# Patient Record
Sex: FEMALE | Race: OTHER | HISPANIC OR LATINO
[De-identification: names, ages, dates, MRNs, and addresses within clinical notes are randomized per-mention and may not be internally consistent; named-entity substitution may affect disease eponyms.]

---

## 2017-08-17 ENCOUNTER — APPOINTMENT (OUTPATIENT)
Dept: ULTRASOUND IMAGING | Facility: CLINIC | Age: 42
End: 2017-08-17
Payer: COMMERCIAL

## 2017-08-17 ENCOUNTER — APPOINTMENT (OUTPATIENT)
Dept: MAMMOGRAPHY | Facility: CLINIC | Age: 42
End: 2017-08-17
Payer: COMMERCIAL

## 2017-08-17 ENCOUNTER — OUTPATIENT (OUTPATIENT)
Dept: OUTPATIENT SERVICES | Facility: HOSPITAL | Age: 42
LOS: 1 days | End: 2017-08-17

## 2017-08-17 PROCEDURE — G0279: CPT | Mod: 26

## 2017-08-17 PROCEDURE — G0204: CPT | Mod: 26

## 2017-08-17 PROCEDURE — 76641 ULTRASOUND BREAST COMPLETE: CPT | Mod: 26,50

## 2017-08-23 ENCOUNTER — APPOINTMENT (OUTPATIENT)
Dept: MAMMOGRAPHY | Facility: IMAGING CENTER | Age: 42
End: 2017-08-23

## 2017-08-23 ENCOUNTER — APPOINTMENT (OUTPATIENT)
Dept: ULTRASOUND IMAGING | Facility: IMAGING CENTER | Age: 42
End: 2017-08-23

## 2018-02-15 ENCOUNTER — FORM ENCOUNTER (OUTPATIENT)
Age: 43
End: 2018-02-15

## 2018-02-16 ENCOUNTER — OUTPATIENT (OUTPATIENT)
Dept: OUTPATIENT SERVICES | Facility: HOSPITAL | Age: 43
LOS: 1 days | End: 2018-02-16
Payer: COMMERCIAL

## 2018-02-16 ENCOUNTER — APPOINTMENT (OUTPATIENT)
Dept: ORTHOPEDIC SURGERY | Facility: CLINIC | Age: 43
End: 2018-02-16
Payer: COMMERCIAL

## 2018-02-16 VITALS — BODY MASS INDEX: 21.9 KG/M2 | WEIGHT: 119 LBS | HEIGHT: 62 IN

## 2018-02-16 DIAGNOSIS — Z63.4 DISAPPEARANCE AND DEATH OF FAMILY MEMBER: ICD-10-CM

## 2018-02-16 DIAGNOSIS — Z78.9 OTHER SPECIFIED HEALTH STATUS: ICD-10-CM

## 2018-02-16 PROCEDURE — 73564 X-RAY EXAM KNEE 4 OR MORE: CPT

## 2018-02-16 PROCEDURE — 99203 OFFICE O/P NEW LOW 30 MIN: CPT

## 2018-02-16 PROCEDURE — 73564 X-RAY EXAM KNEE 4 OR MORE: CPT | Mod: 26,50

## 2018-02-16 SDOH — SOCIAL STABILITY - SOCIAL INSECURITY: DISSAPEARANCE AND DEATH OF FAMILY MEMBER: Z63.4

## 2018-05-18 ENCOUNTER — APPOINTMENT (OUTPATIENT)
Dept: INTERNAL MEDICINE | Facility: CLINIC | Age: 43
End: 2018-05-18

## 2018-05-25 ENCOUNTER — APPOINTMENT (OUTPATIENT)
Dept: INTERNAL MEDICINE | Facility: CLINIC | Age: 43
End: 2018-05-25
Payer: COMMERCIAL

## 2018-05-25 VITALS
HEART RATE: 70 BPM | DIASTOLIC BLOOD PRESSURE: 51 MMHG | RESPIRATION RATE: 16 BRPM | HEIGHT: 60 IN | WEIGHT: 112 LBS | OXYGEN SATURATION: 100 % | SYSTOLIC BLOOD PRESSURE: 88 MMHG | BODY MASS INDEX: 21.99 KG/M2 | TEMPERATURE: 97.7 F

## 2018-05-25 PROCEDURE — 99386 PREV VISIT NEW AGE 40-64: CPT

## 2018-05-25 PROCEDURE — 99202 OFFICE O/P NEW SF 15 MIN: CPT | Mod: 25

## 2018-05-29 RX ORDER — IBUPROFEN 400 MG/1
400 TABLET, FILM COATED ORAL 3 TIMES DAILY
Qty: 42 | Refills: 0 | Status: DISCONTINUED | COMMUNITY
Start: 2018-02-16 | End: 2018-05-29

## 2018-05-29 RX ORDER — IBUPROFEN 200 MG/1
TABLET, COATED ORAL
Refills: 0 | Status: DISCONTINUED | COMMUNITY
End: 2018-05-29

## 2018-05-29 RX ORDER — CLOTRIMAZOLE 10 MG/G
1 CREAM TOPICAL
Qty: 30 | Refills: 0 | Status: DISCONTINUED | COMMUNITY
Start: 2017-10-06 | End: 2018-05-29

## 2018-05-29 NOTE — HISTORY OF PRESENT ILLNESS
[de-identified] : JUAN HENDRICKS is a 42 year old female with h/o patellofemoral pain syndrome here to establish medical care.  Last CPE 1 year ago.  Today feels well - denies chest pain, SOB, dizziness, weakness, palpitations, lightheadedness or syncope\par .

## 2018-05-29 NOTE — HEALTH RISK ASSESSMENT
[Good] : ~his/her~  mood as  good [No falls in past year] : Patient reported no falls in the past year [0] : 2) Feeling down, depressed, or hopeless: Not at all (0) [Patient reported mammogram was normal] : Patient reported mammogram was normal [Patient reported PAP Smear was normal] : Patient reported PAP Smear was normal [With Family] : lives with family [Employed] : employed [Student] : student [# Of Children ___] : has [unfilled] children [Fully functional (bathing, dressing, toileting, transferring, walking, feeding)] : Fully functional (bathing, dressing, toileting, transferring, walking, feeding) [Fully functional (using the telephone, shopping, preparing meals, housekeeping, doing laundry, using] : Fully functional and needs no help or supervision to perform IADLs (using the telephone, shopping, preparing meals, housekeeping, doing laundry, using transportation, managing medications and managing finances) [Reports changes in vision] : Reports changes in vision [Discussed at today's visit] : Advance Directives Discussed at today's visit [] : No [[id number]] : 0 [Change in mental status noted] : No change in mental status noted [Reports changes in hearing] : Reports no changes in hearing [Reports changes in dental health] : Reports no changes in dental health [MammogramDate] : 08/17 [PapSmearDate] : 08/17 [de-identified] : son [FreeTextEntry2] : nurse, [employer], pursuing Masters RN [de-identified] : ophthalmolgy exam 1/10/18

## 2018-05-29 NOTE — REVIEW OF SYSTEMS
[Joint Pain] : joint pain [Negative] : Heme/Lymph [FreeTextEntry4] : nasal congestion [FreeTextEntry9] : right patellofemoral syndrome

## 2018-05-29 NOTE — ASSESSMENT
[FreeTextEntry1] : PLAN\par 42 year old woman with history of patellofemoral syndrome presents for a routine exam, in good general health, stable on the current regimen.  Diet and lifestyle modifications as counseled. Conservative therapy for management of nasal and chest congestion as counseled.  The plan as ordered.\par

## 2018-05-31 LAB
ALBUMIN SERPL ELPH-MCNC: 4.4 G/DL
ALP BLD-CCNC: 70 U/L
ALT SERPL-CCNC: 15 U/L
ANION GAP SERPL CALC-SCNC: 12 MMOL/L
APPEARANCE: CLEAR
AST SERPL-CCNC: 18 U/L
BACTERIA: NEGATIVE
BASOPHILS # BLD AUTO: 0.01 K/UL
BASOPHILS NFR BLD AUTO: 0.2 %
BILIRUB SERPL-MCNC: 0.3 MG/DL
BILIRUBIN URINE: NEGATIVE
BLOOD URINE: NEGATIVE
BUN SERPL-MCNC: 17 MG/DL
CALCIUM SERPL-MCNC: 9.4 MG/DL
CHLORIDE SERPL-SCNC: 107 MMOL/L
CHOLEST SERPL-MCNC: 151 MG/DL
CHOLEST/HDLC SERPL: 2.6 RATIO
CO2 SERPL-SCNC: 23 MMOL/L
COLOR: YELLOW
CREAT SERPL-MCNC: 0.83 MG/DL
EOSINOPHIL # BLD AUTO: 0.17 K/UL
EOSINOPHIL NFR BLD AUTO: 3.2 %
ESTIMATED AVERAGE GLUCOSE: 94 MG/DL
GLUCOSE QUALITATIVE U: NEGATIVE MG/DL
GLUCOSE SERPL-MCNC: 89 MG/DL
HBA1C MFR BLD HPLC: 4.9 %
HCT VFR BLD CALC: 38.1 %
HDLC SERPL-MCNC: 59 MG/DL
HGB BLD-MCNC: 12.8 G/DL
HYALINE CASTS: 1 /LPF
IMM GRANULOCYTES NFR BLD AUTO: 0 %
KETONES URINE: NEGATIVE
LDLC SERPL CALC-MCNC: 80 MG/DL
LEUKOCYTE ESTERASE URINE: NEGATIVE
LYMPHOCYTES # BLD AUTO: 1.75 K/UL
LYMPHOCYTES NFR BLD AUTO: 32.9 %
MAN DIFF?: NORMAL
MCHC RBC-ENTMCNC: 30.5 PG
MCHC RBC-ENTMCNC: 33.6 GM/DL
MCV RBC AUTO: 90.7 FL
MICROSCOPIC-UA: NORMAL
MONOCYTES # BLD AUTO: 0.22 K/UL
MONOCYTES NFR BLD AUTO: 4.1 %
NEUTROPHILS # BLD AUTO: 3.17 K/UL
NEUTROPHILS NFR BLD AUTO: 59.6 %
NITRITE URINE: NEGATIVE
PH URINE: 5.5
PLATELET # BLD AUTO: 223 K/UL
POTASSIUM SERPL-SCNC: 4.4 MMOL/L
PROT SERPL-MCNC: 7 G/DL
PROTEIN URINE: NEGATIVE MG/DL
RBC # BLD: 4.2 M/UL
RBC # FLD: 13.3 %
RED BLOOD CELLS URINE: 0 /HPF
SODIUM SERPL-SCNC: 142 MMOL/L
SPECIFIC GRAVITY URINE: 1.02
SQUAMOUS EPITHELIAL CELLS: 2 /HPF
TRIGL SERPL-MCNC: 62 MG/DL
UROBILINOGEN URINE: NEGATIVE MG/DL
WBC # FLD AUTO: 5.32 K/UL
WHITE BLOOD CELLS URINE: 0 /HPF

## 2018-08-05 ENCOUNTER — FORM ENCOUNTER (OUTPATIENT)
Age: 43
End: 2018-08-05

## 2018-08-06 ENCOUNTER — APPOINTMENT (OUTPATIENT)
Dept: MRI IMAGING | Facility: CLINIC | Age: 43
End: 2018-08-06
Payer: COMMERCIAL

## 2018-08-06 ENCOUNTER — APPOINTMENT (OUTPATIENT)
Dept: ORTHOPEDIC SURGERY | Facility: CLINIC | Age: 43
End: 2018-08-06
Payer: COMMERCIAL

## 2018-08-06 ENCOUNTER — OUTPATIENT (OUTPATIENT)
Dept: OUTPATIENT SERVICES | Facility: HOSPITAL | Age: 43
LOS: 1 days | End: 2018-08-06

## 2018-08-06 DIAGNOSIS — Z82.5 FAMILY HISTORY OF ASTHMA AND OTHER CHRONIC LOWER RESPIRATORY DISEASES: ICD-10-CM

## 2018-08-06 DIAGNOSIS — Z83.79 FAMILY HISTORY OF OTHER DISEASES OF THE DIGESTIVE SYSTEM: ICD-10-CM

## 2018-08-06 DIAGNOSIS — Z87.19 PERSONAL HISTORY OF OTHER DISEASES OF THE DIGESTIVE SYSTEM: ICD-10-CM

## 2018-08-06 DIAGNOSIS — Z82.69 FAMILY HISTORY OF OTHER DISEASES OF THE MUSCULOSKELETAL SYSTEM AND CONNECTIVE TISSUE: ICD-10-CM

## 2018-08-06 PROCEDURE — 99213 OFFICE O/P EST LOW 20 MIN: CPT

## 2018-08-06 PROCEDURE — 73721 MRI JNT OF LWR EXTRE W/O DYE: CPT | Mod: 26,RT

## 2018-08-09 ENCOUNTER — APPOINTMENT (OUTPATIENT)
Dept: ORTHOPEDIC SURGERY | Facility: CLINIC | Age: 43
End: 2018-08-09
Payer: COMMERCIAL

## 2018-08-09 PROCEDURE — 99213 OFFICE O/P EST LOW 20 MIN: CPT

## 2018-09-24 ENCOUNTER — APPOINTMENT (OUTPATIENT)
Dept: OBGYN | Facility: CLINIC | Age: 43
End: 2018-09-24
Payer: COMMERCIAL

## 2018-09-24 VITALS
SYSTOLIC BLOOD PRESSURE: 92 MMHG | HEIGHT: 60 IN | TEMPERATURE: 97.8 F | BODY MASS INDEX: 22.19 KG/M2 | OXYGEN SATURATION: 98 % | DIASTOLIC BLOOD PRESSURE: 74 MMHG | HEART RATE: 83 BPM | WEIGHT: 113 LBS

## 2018-09-24 DIAGNOSIS — Z83.3 FAMILY HISTORY OF DIABETES MELLITUS: ICD-10-CM

## 2018-09-24 PROCEDURE — 99203 OFFICE O/P NEW LOW 30 MIN: CPT

## 2018-09-27 LAB
C TRACH RRNA SPEC QL NAA+PROBE: NOT DETECTED
HPV HIGH+LOW RISK DNA PNL CVX: NOT DETECTED
N GONORRHOEA RRNA SPEC QL NAA+PROBE: NOT DETECTED
SOURCE TP AMPLIFICATION: NORMAL

## 2018-09-28 LAB — CYTOLOGY CVX/VAG DOC THIN PREP: NORMAL

## 2019-05-31 ENCOUNTER — APPOINTMENT (OUTPATIENT)
Dept: INTERNAL MEDICINE | Facility: CLINIC | Age: 44
End: 2019-05-31
Payer: COMMERCIAL

## 2019-05-31 ENCOUNTER — APPOINTMENT (OUTPATIENT)
Dept: OBGYN | Facility: CLINIC | Age: 44
End: 2019-05-31

## 2019-05-31 VITALS
HEART RATE: 81 BPM | DIASTOLIC BLOOD PRESSURE: 63 MMHG | HEIGHT: 60 IN | RESPIRATION RATE: 16 BRPM | TEMPERATURE: 98.3 F | OXYGEN SATURATION: 98 % | SYSTOLIC BLOOD PRESSURE: 97 MMHG | WEIGHT: 120 LBS | BODY MASS INDEX: 23.56 KG/M2

## 2019-05-31 DIAGNOSIS — Z11.3 ENCOUNTER FOR SCREENING FOR INFECTIONS WITH A PREDOMINANTLY SEXUAL MODE OF TRANSMISSION: ICD-10-CM

## 2019-05-31 DIAGNOSIS — Z87.39 PERSONAL HISTORY OF OTHER DISEASES OF THE MUSCULOSKELETAL SYSTEM AND CONNECTIVE TISSUE: ICD-10-CM

## 2019-05-31 DIAGNOSIS — Z12.31 ENCOUNTER FOR SCREENING MAMMOGRAM FOR MALIGNANT NEOPLASM OF BREAST: ICD-10-CM

## 2019-05-31 DIAGNOSIS — Z87.09 PERSONAL HISTORY OF OTHER DISEASES OF THE RESPIRATORY SYSTEM: ICD-10-CM

## 2019-05-31 DIAGNOSIS — Z87.898 PERSONAL HISTORY OF OTHER SPECIFIED CONDITIONS: ICD-10-CM

## 2019-05-31 DIAGNOSIS — R09.89 OTHER SPECIFIED SYMPTOMS AND SIGNS INVOLVING THE CIRCULATORY AND RESPIRATORY SYSTEMS: ICD-10-CM

## 2019-05-31 DIAGNOSIS — Z91.89 OTHER SPECIFIED PERSONAL RISK FACTORS, NOT ELSEWHERE CLASSIFIED: ICD-10-CM

## 2019-05-31 PROCEDURE — 90471 IMMUNIZATION ADMIN: CPT

## 2019-05-31 PROCEDURE — 90715 TDAP VACCINE 7 YRS/> IM: CPT

## 2019-05-31 PROCEDURE — 99386 PREV VISIT NEW AGE 40-64: CPT | Mod: 25

## 2019-05-31 PROCEDURE — 99396 PREV VISIT EST AGE 40-64: CPT | Mod: 25

## 2019-05-31 NOTE — PHYSICAL EXAM
[No Acute Distress] : no acute distress [Well Nourished] : well nourished [Well Developed] : well developed [Well-Appearing] : well-appearing [Normal Sclera/Conjunctiva] : normal sclera/conjunctiva [PERRL] : pupils equal round and reactive to light [EOMI] : extraocular movements intact [Normal Outer Ear/Nose] : the outer ears and nose were normal in appearance [Normal Oropharynx] : the oropharynx was normal [No JVD] : no jugular venous distention [Supple] : supple [No Lymphadenopathy] : no lymphadenopathy [Thyroid Normal, No Nodules] : the thyroid was normal and there were no nodules present [No Respiratory Distress] : no respiratory distress  [Clear to Auscultation] : lungs were clear to auscultation bilaterally [No Accessory Muscle Use] : no accessory muscle use [Normal Rate] : normal rate  [Regular Rhythm] : with a regular rhythm [Normal S1, S2] : normal S1 and S2 [No Murmur] : no murmur heard [No Carotid Bruits] : no carotid bruits [No Abdominal Bruit] : a ~M bruit was not heard ~T in the abdomen [No Varicosities] : no varicosities [Pedal Pulses Present] : the pedal pulses are present [No Edema] : there was no peripheral edema [No Extremity Clubbing/Cyanosis] : no extremity clubbing/cyanosis [No Palpable Aorta] : no palpable aorta [Normal Appearance] : normal in appearance [No Nipple Discharge] : no nipple discharge [No Axillary Lymphadenopathy] : no axillary lymphadenopathy [Soft] : abdomen soft [Non Tender] : non-tender [Non-distended] : non-distended [No Masses] : no abdominal mass palpated [No HSM] : no HSM [Normal Bowel Sounds] : normal bowel sounds [Normal Supraclavicular Nodes] : no supraclavicular lymphadenopathy [Normal Posterior Cervical Nodes] : no posterior cervical lymphadenopathy [Normal Anterior Cervical Nodes] : no anterior cervical lymphadenopathy [No CVA Tenderness] : no CVA  tenderness [No Spinal Tenderness] : no spinal tenderness [No Joint Swelling] : no joint swelling [Grossly Normal Strength/Tone] : grossly normal strength/tone [No Rash] : no rash [Normal Gait] : normal gait [Coordination Grossly Intact] : coordination grossly intact [No Focal Deficits] : no focal deficits [Normal Affect] : the affect was normal [Normal Insight/Judgement] : insight and judgment were intact

## 2019-06-01 NOTE — ASSESSMENT
[FreeTextEntry1] : [facility].  UTD on cervical cancer screening.  received tdap today. routine labs

## 2019-06-01 NOTE — HEALTH RISK ASSESSMENT
[0] : 2) Feeling down, depressed, or hopeless: Not at all (0) [Patient reported PAP Smear was normal] : Patient reported PAP Smear was normal [Fully functional (bathing, dressing, toileting, transferring, walking, feeding)] : Fully functional (bathing, dressing, toileting, transferring, walking, feeding) [Fully functional (using the telephone, shopping, preparing meals, housekeeping, doing laundry, using] : Fully functional and needs no help or supervision to perform IADLs (using the telephone, shopping, preparing meals, housekeeping, doing laundry, using transportation, managing medications and managing finances) [] : No [[id number]] : 0 [Reports changes in hearing] : Reports no changes in hearing [Reports changes in vision] : Reports no changes in vision [Reports changes in dental health] : Reports no changes in dental health [PapSmearDate] : 09/2018

## 2019-06-01 NOTE — HISTORY OF PRESENT ILLNESS
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 knee pain is minimal \par no complaints

## 2019-06-04 LAB
APPEARANCE: CLEAR
BILIRUBIN URINE: NEGATIVE
BLOOD URINE: NEGATIVE
COLOR: YELLOW
GLUCOSE QUALITATIVE U: NEGATIVE
KETONES URINE: NEGATIVE
LEUKOCYTE ESTERASE URINE: NEGATIVE
NITRITE URINE: NEGATIVE
PH URINE: 5.5
PROTEIN URINE: NORMAL
SPECIFIC GRAVITY URINE: 1.03
TSH SERPL-ACNC: 1.72 UIU/ML
UROBILINOGEN URINE: NORMAL

## 2019-06-17 ENCOUNTER — APPOINTMENT (OUTPATIENT)
Dept: OBGYN | Facility: CLINIC | Age: 44
End: 2019-06-17

## 2019-06-24 ENCOUNTER — APPOINTMENT (OUTPATIENT)
Dept: OBGYN | Facility: CLINIC | Age: 44
End: 2019-06-24
Payer: COMMERCIAL

## 2019-06-24 VITALS
HEIGHT: 60 IN | HEART RATE: 85 BPM | WEIGHT: 119 LBS | DIASTOLIC BLOOD PRESSURE: 60 MMHG | SYSTOLIC BLOOD PRESSURE: 93 MMHG | BODY MASS INDEX: 23.36 KG/M2

## 2019-06-24 DIAGNOSIS — Z30.011 ENCOUNTER FOR INITIAL PRESCRIPTION OF CONTRACEPTIVE PILLS: ICD-10-CM

## 2019-06-24 DIAGNOSIS — M76.31 ILIOTIBIAL BAND SYNDROME, RIGHT LEG: ICD-10-CM

## 2019-06-24 DIAGNOSIS — M22.2X1 PATELLOFEMORAL DISORDERS, RIGHT KNEE: ICD-10-CM

## 2019-06-24 DIAGNOSIS — M23.8X1 OTHER INTERNAL DERANGEMENTS OF RIGHT KNEE: ICD-10-CM

## 2019-06-24 PROCEDURE — 99213 OFFICE O/P EST LOW 20 MIN: CPT

## 2019-06-24 NOTE — CHIEF COMPLAINT
[FreeTextEntry1] : LMP (06/16/2019)\par \par Patient recently became sexually active, wants to start OCP's.

## 2019-07-04 ENCOUNTER — TRANSCRIPTION ENCOUNTER (OUTPATIENT)
Age: 44
End: 2019-07-04

## 2019-07-10 DIAGNOSIS — K13.79 OTHER LESIONS OF ORAL MUCOSA: ICD-10-CM

## 2019-08-08 ENCOUNTER — APPOINTMENT (OUTPATIENT)
Dept: INTERNAL MEDICINE | Facility: CLINIC | Age: 44
End: 2019-08-08

## 2019-10-31 ENCOUNTER — APPOINTMENT (OUTPATIENT)
Dept: OBGYN | Facility: CLINIC | Age: 44
End: 2019-10-31
Payer: COMMERCIAL

## 2019-10-31 VITALS
WEIGHT: 123 LBS | TEMPERATURE: 97.8 F | BODY MASS INDEX: 24.15 KG/M2 | HEART RATE: 87 BPM | SYSTOLIC BLOOD PRESSURE: 82 MMHG | OXYGEN SATURATION: 98 % | DIASTOLIC BLOOD PRESSURE: 49 MMHG | HEIGHT: 60 IN

## 2019-10-31 PROCEDURE — 99396 PREV VISIT EST AGE 40-64: CPT

## 2019-10-31 NOTE — PHYSICAL EXAM
[Awake] : awake [Alert] : alert [Soft] : soft [Oriented x3] : oriented to person, place, and time [Normal] : uterus [No Bleeding] : there was no active vaginal bleeding [Discharge] : had a ~M discharge [Moderate] : moderate [White] : white [Thick] : thick [Uterine Adnexae] : were not tender and not enlarged [Acute Distress] : no acute distress [Mass] : no breast mass [Nipple Discharge] : no nipple discharge [Axillary LAD] : no axillary lymphadenopathy [Tender] : non tender [Foul Smelling] : not foul smelling

## 2019-11-02 LAB
C TRACH RRNA SPEC QL NAA+PROBE: NOT DETECTED
N GONORRHOEA RRNA SPEC QL NAA+PROBE: NOT DETECTED
SOURCE AMPLIFICATION: NORMAL

## 2019-12-23 ENCOUNTER — EMERGENCY (EMERGENCY)
Facility: HOSPITAL | Age: 44
LOS: 1 days | Discharge: HOME | End: 2019-12-23
Attending: STUDENT IN AN ORGANIZED HEALTH CARE EDUCATION/TRAINING PROGRAM | Admitting: STUDENT IN AN ORGANIZED HEALTH CARE EDUCATION/TRAINING PROGRAM
Payer: OTHER MISCELLANEOUS

## 2019-12-23 VITALS
OXYGEN SATURATION: 100 % | SYSTOLIC BLOOD PRESSURE: 119 MMHG | TEMPERATURE: 98 F | DIASTOLIC BLOOD PRESSURE: 75 MMHG | RESPIRATION RATE: 18 BRPM | HEART RATE: 71 BPM

## 2019-12-23 PROCEDURE — 99053 MED SERV 10PM-8AM 24 HR FAC: CPT

## 2019-12-23 PROCEDURE — 73030 X-RAY EXAM OF SHOULDER: CPT | Mod: 26,LT

## 2019-12-23 PROCEDURE — 99283 EMERGENCY DEPT VISIT LOW MDM: CPT

## 2019-12-23 RX ORDER — ACETAMINOPHEN 500 MG
650 TABLET ORAL ONCE
Refills: 0 | Status: COMPLETED | OUTPATIENT
Start: 2019-12-23 | End: 2019-12-23

## 2019-12-23 RX ADMIN — Medication 650 MILLIGRAM(S): at 08:46

## 2019-12-23 NOTE — ED PROVIDER NOTE - CLINICAL SUMMARY MEDICAL DECISION MAKING FREE TEXT BOX
43 y/o F pt presents to ED with L shoulder pain, likely muscle strain but low suspicion for dislocation. Will give analgesia, obtain shoulder XR, and anticipate dc with sports meds f/u.

## 2019-12-23 NOTE — ED PROVIDER NOTE - PHYSICAL EXAMINATION
MSK: R shoulder full range of motion w/o pain. L shoulder can abduct to 180 degrees but has limited motion beyond 180 secondary to pain, full range of motion with assistance. No gross abnormality noted.

## 2019-12-23 NOTE — ED ADULT TRIAGE NOTE - CHIEF COMPLAINT QUOTE
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

## 2019-12-23 NOTE — ED PROVIDER NOTE - PATIENT PORTAL LINK FT
You can access the FollowMyHealth Patient Portal offered by [facility] by registering at the following website: [email or web address]. By joining [employer]’s FollowMyHealth portal, you will also be able to view your health information using other applications (apps) compatible with our system.

## 2019-12-26 ENCOUNTER — APPOINTMENT (OUTPATIENT)
Dept: ORTHOPEDIC SURGERY | Facility: CLINIC | Age: 44
End: 2019-12-26
Payer: OTHER MISCELLANEOUS

## 2019-12-26 VITALS — HEIGHT: 62 IN | BODY MASS INDEX: 21.71 KG/M2 | WEIGHT: 118 LBS

## 2019-12-26 PROCEDURE — 99215 OFFICE O/P EST HI 40 MIN: CPT

## 2019-12-26 PROCEDURE — 73030 X-RAY EXAM OF SHOULDER: CPT | Mod: LT

## 2019-12-26 NOTE — PHYSICAL EXAM
[de-identified] : Left shoulder radiographs (12/23/19): Possible AC joint widening. No acute fracture. Glenohumeral joint reduced.  \par \par AP chest obtained in the office today shows separation of the left AC joint compared to the right AC joint\par \par Zanca view of left shoulder obtained in the office today shows AC joint separation [de-identified] : General: No acute distress, conversant, well-nourished.\par Head: Normocephalic, atraumatic\par Neck: trachea midline, FROM\par Heart: normotensive and normal rate and rhythm\par Lungs: No labored breathing\par Skin: No abrasions, no rashes, no edema\par Psych: Alert and oriented to person, place and time\par Extremities: no peripheral edema or digital cyanosis\par Gait: Normal gait. Can perform tandem gait.  \par Vascular: warm and well perfused distally, palpable distal pulses\par \par MSK:\par Left Shoulder Exam:\par No swelling, no erythema.  \par Tender to palpation at AC joint\par Pain with active ROM\par \par Positive Neer's impingement sign\par Positive Hawkin's test\par Positive Andrea's test\par Pain but good strength with shoulder ER and IR.\par \par No tenderness at bicipital groove\par Negative Speed's test\par Negative Yergson's test\par \par Negative Cross-body Adduction\par Negative [place]'s test\par \par Sensation intact to light touch axillary, medial and lateral antebrachial cutaneous, median, radial and ulnar distributions\par +motor deltoid, biceps, triceps, EPL, FDP and IO\par palpable radial pulse, WWP distally\par \par MSK:\par Cervical Spine: \par Alignment normal. \par No tenderness to palpation.  No step-off, no deformity.\par No pain or neurologic symptoms with full active range of motion (flexion, extension, lateral bending and rotation).\par \par NEURO:\par Sensation \par          Left           \par C5     2/2               \par C6     2/2               \par C7     2/2               \par C8     2/2              \par T1     2/2             \par \par          Right         \par C5     2/2               \par C6     2/2               \par C7     2/2               \par C8     2/2              \par T1     2/2      \par \par Motor: \par                                                Left             \par C5 (deltoid abduction)             5/5               \par C6 (biceps flexion)                   5/5                \par C7 (triceps extension)             5/5               \par C8 (finger flexion)                     5/5               \par T1 (interosseous)                     5/5           \par \par                                                Right           \par C5 (deltoid abduction)             5/5               \par C6 (biceps flexion)                   5/5                \par C7 (triceps extension)             5/5               \par C8 (finger flexion)                     5/5               \par T1 (interosseous)                     5/5                     \par \par Negative Spurling’s test. Negative Quezada’s reflex.  \par

## 2019-12-26 NOTE — PROCEDURE
[de-identified] : Procedure: Left shoulder subacromial injection with corticosteroid\par Pre-Procedure Diagnosis: Left shoulder rotator cuff tendinitis\par Post-Procedure Diagnosis: same\par \par The patient was educated about the risks and benefits of a corticosteroid injection.  Alternatives were discussed.  The patient understood and consented for the procedure.\par \par The area was sterilely prepped using isopropyl alcohol.  An ethyl chloride spray provided  local anesthesia.  Using the usual sterile technique, 1 ml of 40mg/1ml of Kenalog and 2 ml of Lidocaine 1% without epinephrine was injected into the left shoulder subacromial space.  A dressing was applied to the area.  The patient tolerated the procedure well and without complication.\par

## 2019-12-26 NOTE — ASSESSMENT
[FreeTextEntry1] : 44 year old female with left Grade 1-2 AC joint separation and left rotator cuff tendinitis after a lifting injury at work on 12/23/19.  She was given a left shoulder subacromial corticosteroid injection.  She is not to lift more than 5 lbs with her left upper extremity.  She was given a referral for physical therapy for her left shoulder for rotator cuff exercises. She can take anti-inflammatories as needed for pain.  She will be out of work for at least 2 weeks.  She will followup in 2 weeks for re-evaluation.  She knows to call with any questions or concerns or if her symptoms acutely worsen.

## 2020-01-09 ENCOUNTER — APPOINTMENT (OUTPATIENT)
Dept: ORTHOPEDIC SURGERY | Facility: CLINIC | Age: 45
End: 2020-01-09
Payer: OTHER MISCELLANEOUS

## 2020-01-09 PROCEDURE — 99213 OFFICE O/P EST LOW 20 MIN: CPT

## 2020-01-10 NOTE — ASSESSMENT
[FreeTextEntry1] : 44 year old female with left AC joint injury and rotator cuff tendinitis.  The rotator cuff tendinitis has improved and the pain resolved after her steroid injection during the last visit.  She has good ROM of that shoulder.  She still has pain and tenderness in her left AC joint.  She should continue to not lift more than 5 lbs with that shoulder.  She will continue physical therapy.  She will followup in 1 month.  She knows to call with any questions or concerns or if her symptoms acutely worsen.

## 2020-01-10 NOTE — PHYSICAL EXAM
[de-identified] : General: No acute distress, conversant, well-nourished.\par Head: Normocephalic, atraumatic\par Neck: trachea midline, FROM\par Heart: normotensive and normal rate and rhythm\par Lungs: No labored breathing\par Skin: No abrasions, no rashes, no edema\par Psych: Alert and oriented to person, place and time\par Extremities: no peripheral edema or digital cyanosis\par Gait: Normal gait. Can perform tandem gait. \par Vascular: warm and well perfused distally, palpable distal pulses\par \par MSK:\par Left Shoulder Exam:\par No swelling, no erythema. \par Tender to palpation at AC joint\par No pain with active ROM\par \par Negative Neer's impingement sign\par Negative Hawkin's test\par Negative Andrea's test\par No pain and good strength with shoulder ER and IR.\par \par No tenderness at bicipital groove\par Negative Speed's test\par Negative Yergson's test\par \par Negative Cross-body Adduction\par Negative [place]'s test\par \par Sensation intact to light touch axillary, medial and lateral antebrachial cutaneous, median, radial and ulnar distributions\par +motor deltoid, biceps, triceps, EPL, FDP and IO\par palpable radial pulse, WWP distally\par

## 2020-01-10 NOTE — HISTORY OF PRESENT ILLNESS
[de-identified] : 44 year old female returns for L shoulder pain due to a work related injury on 12/23/19.  She says her pain has significantly improved and she has good ROM of her shoulder.  However, she still feels pain at her AC joint.  She also feels increased pain when she holds her cellphone in her left hand.  She has been going to PT.

## 2020-02-04 ENCOUNTER — APPOINTMENT (OUTPATIENT)
Dept: ORTHOPEDIC SURGERY | Facility: CLINIC | Age: 45
End: 2020-02-04
Payer: OTHER MISCELLANEOUS

## 2020-02-04 DIAGNOSIS — S43.109A UNSPECIFIED DISLOCATION OF UNSPECIFIED ACROMIOCLAVICULAR JOINT, INITIAL ENCOUNTER: ICD-10-CM

## 2020-02-04 PROCEDURE — 99213 OFFICE O/P EST LOW 20 MIN: CPT

## 2020-02-06 NOTE — PHYSICAL EXAM
[de-identified] : General: No acute distress, conversant, well-nourished.\par Head: Normocephalic, atraumatic\par Neck: trachea midline, FROM\par Heart: normotensive and normal rate and rhythm\par Lungs: No labored breathing\par Skin: No abrasions, no rashes, no edema\par Psych: Alert and oriented to person, place and time\par Extremities: no peripheral edema or digital cyanosis\par Gait: Normal gait. Can perform tandem gait. \par Vascular: warm and well perfused distally, palpable distal pulses\par \par MSK:\par Left Shoulder Exam:\par No swelling, no erythema. \par Tender to palpation at AC joint\par No pain with active ROM\par \par Negative Neer's impingement sign\par Negative Hawkin's test\par Negative Andrea's test\par No pain and good strength with shoulder ER and IR.\par \par No tenderness at bicipital groove\par Negative Speed's test\par Negative Yergson's test\par \par Negative Cross-body Adduction\par Negative [place]'s test\par \par Sensation intact to light touch axillary, medial and lateral antebrachial cutaneous, median, radial and ulnar distributions\par +motor deltoid, biceps, triceps, EPL, FDP and IO\par palpable radial pulse, WWP distally

## 2020-02-06 NOTE — HISTORY OF PRESENT ILLNESS
[de-identified] : 44 year old female returns for left shoulder pain due to work related injury on 12/23/19. She says the pain is improving but she is not ready to return to work.  She has been doing physical therapy.

## 2020-02-06 NOTE — ASSESSMENT
[FreeTextEntry1] : 44 year old female with left AC joint injury and rotator cuff tendinitis.  The patient continues to feel weak and has pain in her shoulder preventing her from returning to work and despite PT.  She will be sent for a MRI of her left shoulder.  She will followup once her MRI has been completed.  She will continue physical therapy.  She knows to call with any questions or concerns or if her symptoms acutely worsen.

## 2020-02-14 ENCOUNTER — APPOINTMENT (OUTPATIENT)
Dept: INTERNAL MEDICINE | Facility: CLINIC | Age: 45
End: 2020-02-14
Payer: COMMERCIAL

## 2020-02-14 VITALS
WEIGHT: 123 LBS | DIASTOLIC BLOOD PRESSURE: 61 MMHG | RESPIRATION RATE: 16 BRPM | BODY MASS INDEX: 22.63 KG/M2 | HEIGHT: 62 IN | TEMPERATURE: 97.1 F | SYSTOLIC BLOOD PRESSURE: 95 MMHG | OXYGEN SATURATION: 97 % | HEART RATE: 76 BPM

## 2020-02-14 PROCEDURE — 99214 OFFICE O/P EST MOD 30 MIN: CPT

## 2020-02-14 NOTE — ASSESSMENT
[FreeTextEntry1] : routine labs to complete medical form\par advised that may require 10 days for quantiferon result to return -> needs form by 3/1

## 2020-02-14 NOTE — HISTORY OF PRESENT ILLNESS
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

## 2020-02-14 NOTE — PHYSICAL EXAM
[No Acute Distress] : no acute distress [Well Nourished] : well nourished [Well Developed] : well developed [EOMI] : extraocular movements intact [PERRL] : pupils equal round and reactive to light [Normal Sclera/Conjunctiva] : normal sclera/conjunctiva [Well-Appearing] : well-appearing [Normal Outer Ear/Nose] : the outer ears and nose were normal in appearance [No JVD] : no jugular venous distention [Normal Oropharynx] : the oropharynx was normal [Supple] : supple [No Lymphadenopathy] : no lymphadenopathy [No Accessory Muscle Use] : no accessory muscle use [Clear to Auscultation] : lungs were clear to auscultation bilaterally [No Respiratory Distress] : no respiratory distress  [Normal Rate] : normal rate  [Regular Rhythm] : with a regular rhythm [Normal S1, S2] : normal S1 and S2 [No Abdominal Bruit] : a ~M bruit was not heard ~T in the abdomen [No Carotid Bruits] : no carotid bruits [No Varicosities] : no varicosities [No Murmur] : no murmur heard [No Palpable Aorta] : no palpable aorta [No Edema] : there was no peripheral edema [Pedal Pulses Present] : the pedal pulses are present [No Extremity Clubbing/Cyanosis] : no extremity clubbing/cyanosis [Soft] : abdomen soft [Non Tender] : non-tender [No Masses] : no abdominal mass palpated [Non-distended] : non-distended [No HSM] : no HSM [Normal Anterior Cervical Nodes] : no anterior cervical lymphadenopathy [Normal Posterior Cervical Nodes] : no posterior cervical lymphadenopathy [Normal Bowel Sounds] : normal bowel sounds [No Spinal Tenderness] : no spinal tenderness [No CVA Tenderness] : no CVA  tenderness [No Joint Swelling] : no joint swelling [No Rash] : no rash [Grossly Normal Strength/Tone] : grossly normal strength/tone [Coordination Grossly Intact] : coordination grossly intact [No Focal Deficits] : no focal deficits [Normal Affect] : the affect was normal [Normal Gait] : normal gait [Normal Insight/Judgement] : insight and judgment were intact

## 2020-02-15 LAB
HBV SURFACE AB SER QL: REACTIVE
HBV SURFACE AG SER QL: NONREACTIVE
MEV IGG FLD QL IA: 86.4 AU/ML
MEV IGG+IGM SER-IMP: POSITIVE
MUV AB SER-ACNC: POSITIVE
MUV IGG SER QL IA: 47.6 AU/ML
RUBV IGG FLD-ACNC: 15.7 INDEX
RUBV IGG SER-IMP: POSITIVE
VZV AB TITR SER: POSITIVE
VZV IGG SER IF-ACNC: 1019 INDEX

## 2020-02-17 LAB
M TB IFN-G BLD-IMP: NEGATIVE
QUANTIFERON TB PLUS MITOGEN MINUS NIL: 8.35 IU/ML
QUANTIFERON TB PLUS NIL: 0.13 IU/ML
QUANTIFERON TB PLUS TB1 MINUS NIL: -0.04 IU/ML
QUANTIFERON TB PLUS TB2 MINUS NIL: -0.08 IU/ML

## 2020-02-21 ENCOUNTER — APPOINTMENT (OUTPATIENT)
Dept: ORTHOPEDIC SURGERY | Facility: CLINIC | Age: 45
End: 2020-02-21
Payer: OTHER MISCELLANEOUS

## 2020-02-21 PROCEDURE — 99213 OFFICE O/P EST LOW 20 MIN: CPT

## 2020-02-22 NOTE — ASSESSMENT
[FreeTextEntry1] : 44 year old female with left shoulder injury.  She has progressed well with physical therapy.  She has good function and her pain has significantly improved.  She is happy with her progress.  She is cleared to return to work without restriction.  She can followup as needed.  She knows to call with any questions or concerns or if her symptoms acutely worsen.

## 2020-02-22 NOTE — HISTORY OF PRESENT ILLNESS
[de-identified] : 44 year old female returns for followup for left shoulder pain after a work related injury on 12/23/19. She has significant improvement in her pain and function.  She is continuing PT.  She is also here to review her recent MRI.  She is off pain medication.

## 2020-02-22 NOTE — PHYSICAL EXAM
[de-identified] : General: No acute distress, conversant, well-nourished.\par Head: Normocephalic, atraumatic\par Neck: trachea midline, FROM\par Heart: normotensive and normal rate and rhythm\par Lungs: No labored breathing\par Skin: No abrasions, no rashes, no edema\par Psych: Alert and oriented to person, place and time\par Extremities: no peripheral edema or digital cyanosis\par Gait: Normal gait. Can perform tandem gait. \par Vascular: warm and well perfused distally, palpable distal pulses\par \par MSK:\par Left Shoulder Exam:\par No swelling, no erythema. \par No tenderness to palpation \par No pain with active ROM\par \par Negative Neer's impingement sign\par Negative Hawkin's test\par Negative Andrea's test\par No pain and good strength with shoulder ER and IR.\par \par No tenderness at bicipital groove\par Negative Speed's test\par Negative Yergson's test\par \par Negative Cross-body Adduction\par Negative [place]'s test\par \par Sensation intact to light touch axillary, medial and lateral antebrachial cutaneous, median, radial and ulnar distributions\par +motor deltoid, biceps, triceps, EPL, FDP and IO\par palpable radial pulse, WWP distally  [de-identified] : MRI Left Shoulder (2/14/20): Partial tear/tendinosis of distal and posterior supraspinatus tendon 393

## 2020-04-26 ENCOUNTER — MESSAGE (OUTPATIENT)
Age: 45
End: 2020-04-26

## 2020-05-07 LAB
SARS-COV-2 IGG SERPL IA-ACNC: <0.1 INDEX
SARS-COV-2 IGG SERPL QL IA: NEGATIVE

## 2020-09-15 ENCOUNTER — APPOINTMENT (OUTPATIENT)
Dept: INTERNAL MEDICINE | Facility: CLINIC | Age: 45
End: 2020-09-15
Payer: COMMERCIAL

## 2020-09-15 VITALS
HEIGHT: 62 IN | WEIGHT: 120 LBS | OXYGEN SATURATION: 99 % | HEART RATE: 78 BPM | TEMPERATURE: 98.6 F | BODY MASS INDEX: 22.08 KG/M2 | DIASTOLIC BLOOD PRESSURE: 58 MMHG | RESPIRATION RATE: 16 BRPM | SYSTOLIC BLOOD PRESSURE: 91 MMHG

## 2020-09-15 DIAGNOSIS — Z80.0 FAMILY HISTORY OF MALIGNANT NEOPLASM OF DIGESTIVE ORGANS: ICD-10-CM

## 2020-09-15 DIAGNOSIS — Z80.49 FAMILY HISTORY OF MALIGNANT NEOPLASM OF OTHER GENITAL ORGANS: ICD-10-CM

## 2020-09-15 DIAGNOSIS — Z11.59 ENCOUNTER FOR SCREENING FOR OTHER VIRAL DISEASES: ICD-10-CM

## 2020-09-15 PROCEDURE — 99396 PREV VISIT EST AGE 40-64: CPT

## 2020-09-15 PROCEDURE — 99386 PREV VISIT NEW AGE 40-64: CPT

## 2020-09-21 ENCOUNTER — APPOINTMENT (OUTPATIENT)
Dept: OBGYN | Facility: CLINIC | Age: 45
End: 2020-09-21
Payer: COMMERCIAL

## 2020-09-21 VITALS
WEIGHT: 121 LBS | DIASTOLIC BLOOD PRESSURE: 48 MMHG | TEMPERATURE: 97.5 F | HEART RATE: 88 BPM | HEIGHT: 62 IN | SYSTOLIC BLOOD PRESSURE: 82 MMHG | BODY MASS INDEX: 22.26 KG/M2

## 2020-09-21 PROCEDURE — 99396 PREV VISIT EST AGE 40-64: CPT

## 2020-09-21 RX ORDER — VALACYCLOVIR 1 G/1
1 TABLET, FILM COATED ORAL
Qty: 4 | Refills: 0 | Status: COMPLETED | COMMUNITY
Start: 2019-07-04 | End: 2020-09-21

## 2020-09-21 RX ORDER — VALACYCLOVIR 500 MG/1
500 TABLET, FILM COATED ORAL
Qty: 30 | Refills: 1 | Status: COMPLETED | COMMUNITY
Start: 2019-07-10 | End: 2020-09-21

## 2020-09-21 NOTE — PHYSICAL EXAM
[Appropriately responsive] : appropriately responsive [Alert] : alert [No Acute Distress] : no acute distress [No Lymphadenopathy] : no lymphadenopathy [Regular Rate Rhythm] : regular rate rhythm [No Murmurs] : no murmurs [Clear to Auscultation B/L] : clear to auscultation bilaterally [Soft] : soft [Non-tender] : non-tender [Non-distended] : non-distended [No HSM] : No HSM [No Lesions] : no lesions [No Mass] : no mass [Oriented x3] : oriented x3 [Examination Of The Breasts] : a normal appearance [No Masses] : no breast masses were palpable [Labia Majora] : normal [Discharge] : discharge [Scant] : scant [Foul Smelling] : not foul smelling [White] : white [Thick] : thick [Normal] : normal [No Tenderness] : no tenderness

## 2020-09-21 NOTE — HISTORY OF PRESENT ILLNESS
[Patient reported mammogram was normal] : Patient reported mammogram was normal [Patient reported PAP Smear was normal] : Patient reported PAP Smear was normal [Gonorrhea test offered] : Gonorrhea test offered [Chlamydia test offered] : Chlamydia test offered [HPV test offered] : HPV test offered [N] : Patient reports normal menses [Y] : Positive pregnancy history [LMPDate] : 09/01/2020 [MensesFreq] : 28 [MensesLength] : 3 [MensesAmount] : moderate [PGHxTotal] : 3 [[facility]ho[facility] HospitalxFulerm] : 1 [PGHxABSpont] : 2 [Previously active] : previously active [Men] : men [Vaginal] : vaginal [No] : No

## 2020-09-21 NOTE — PROCEDURE
[Cervical Pap Smear] : cervical Pap smear [Liquid Base] : liquid base [GC & Chlamydia via Pap] : GC & Chlamydia via Pap [Tolerated Well] : the patient tolerated the procedure well [No Complications] : there were no complications [de-identified] : hpv

## 2020-09-21 NOTE — COUNSELING
[Nutrition/ Exercise/ Weight Management] : nutrition, exercise, weight management [Vitamins/Supplements] : vitamins/supplements [Sunscreen] : sunscreen [Breast Self Exam] : breast self exam

## 2020-09-22 LAB
25(OH)D3 SERPL-MCNC: 20.8 NG/ML
ALBUMIN SERPL ELPH-MCNC: 4.7 G/DL
ALP BLD-CCNC: 78 U/L
ALT SERPL-CCNC: 12 U/L
ANION GAP SERPL CALC-SCNC: 12 MMOL/L
APPEARANCE: CLEAR
AST SERPL-CCNC: 14 U/L
BASOPHILS # BLD AUTO: 0.02 K/UL
BASOPHILS NFR BLD AUTO: 0.3 %
BILIRUB SERPL-MCNC: 0.4 MG/DL
BILIRUBIN URINE: NEGATIVE
BLOOD URINE: NEGATIVE
BUN SERPL-MCNC: 16 MG/DL
C TRACH RRNA SPEC QL NAA+PROBE: NOT DETECTED
CALCIUM SERPL-MCNC: 10.3 MG/DL
CHLORIDE SERPL-SCNC: 101 MMOL/L
CO2 SERPL-SCNC: 24 MMOL/L
COLOR: NORMAL
CREAT SERPL-MCNC: 0.82 MG/DL
EOSINOPHIL # BLD AUTO: 0.09 K/UL
EOSINOPHIL NFR BLD AUTO: 1.3 %
GLUCOSE QUALITATIVE U: NEGATIVE
GLUCOSE SERPL-MCNC: 87 MG/DL
HCT VFR BLD CALC: 44.2 %
HGB BLD-MCNC: 14.6 G/DL
HPV HIGH+LOW RISK DNA PNL CVX: NOT DETECTED
IMM GRANULOCYTES NFR BLD AUTO: 0.1 %
KETONES URINE: NEGATIVE
LEUKOCYTE ESTERASE URINE: NEGATIVE
LYMPHOCYTES # BLD AUTO: 1.88 K/UL
LYMPHOCYTES NFR BLD AUTO: 27.3 %
MAN DIFF?: NORMAL
MCHC RBC-ENTMCNC: 30.4 PG
MCHC RBC-ENTMCNC: 33 GM/DL
MCV RBC AUTO: 92.1 FL
MONOCYTES # BLD AUTO: 0.37 K/UL
MONOCYTES NFR BLD AUTO: 5.4 %
N GONORRHOEA RRNA SPEC QL NAA+PROBE: NOT DETECTED
NEUTROPHILS # BLD AUTO: 4.52 K/UL
NEUTROPHILS NFR BLD AUTO: 65.6 %
NITRITE URINE: NEGATIVE
PH URINE: 6
PLATELET # BLD AUTO: 244 K/UL
POTASSIUM SERPL-SCNC: 4.8 MMOL/L
PROT SERPL-MCNC: 7.4 G/DL
PROTEIN URINE: NEGATIVE
RBC # BLD: 4.8 M/UL
RBC # FLD: 12.1 %
SARS-COV-2 IGG SERPL IA-ACNC: 0.09 INDEX
SARS-COV-2 IGG SERPL QL IA: NEGATIVE
SARS-COV-2 N GENE NPH QL NAA+PROBE: NOT DETECTED
SODIUM SERPL-SCNC: 137 MMOL/L
SOURCE TP AMPLIFICATION: NORMAL
SPECIFIC GRAVITY URINE: 1.02
TSH SERPL-ACNC: 0.91 UIU/ML
UROBILINOGEN URINE: NORMAL
WBC # FLD AUTO: 6.89 K/UL

## 2020-09-25 LAB — CYTOLOGY CVX/VAG DOC THIN PREP: NORMAL

## 2021-01-12 ENCOUNTER — APPOINTMENT (OUTPATIENT)
Dept: OTOLARYNGOLOGY | Facility: CLINIC | Age: 46
End: 2021-01-12
Payer: COMMERCIAL

## 2021-01-12 VITALS
HEART RATE: 87 BPM | TEMPERATURE: 98.8 F | WEIGHT: 120 LBS | DIASTOLIC BLOOD PRESSURE: 51 MMHG | BODY MASS INDEX: 22.08 KG/M2 | OXYGEN SATURATION: 98 % | RESPIRATION RATE: 14 BRPM | HEIGHT: 62 IN | SYSTOLIC BLOOD PRESSURE: 103 MMHG

## 2021-01-12 DIAGNOSIS — R42 DIZZINESS AND GIDDINESS: ICD-10-CM

## 2021-01-12 DIAGNOSIS — T50.905A ADVERSE EFFECT OF UNSPECIFIED DRUGS, MEDICAMENTS AND BIOLOGICAL SUBSTANCES, INITIAL ENCOUNTER: ICD-10-CM

## 2021-01-12 DIAGNOSIS — Z78.9 OTHER SPECIFIED HEALTH STATUS: ICD-10-CM

## 2021-01-12 PROCEDURE — 99204 OFFICE O/P NEW MOD 45 MIN: CPT

## 2021-01-12 PROCEDURE — 99072 ADDL SUPL MATRL&STAF TM PHE: CPT

## 2021-01-12 NOTE — HISTORY OF PRESENT ILLNESS
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 She then had COVID-19 vaccine on 1/4/21 got dizzy within 5 minutes and it improved. It  recurred 6 days later and is getting better but is still present. 1/10/21 Clint she was off balance and taking meclizine. Denies tinnitus, hl or fullness. Yesterday she felt a popping sound going from the right to the left ears, 1 episode. She also says that when she lies down and turns to r but not left she gets dizzy. -n/v. has had some headaches. -f.s.c. She wishes to be examined and is asking for a note for work because she she says she will be unable to work if she continues in this condition as she could fall and hurt herself. She wished to know if hormones could contribute to this. She has been taking meclizine.

## 2021-01-12 NOTE — PHYSICAL EXAM
[Midline] : trachea located in midline position [Normal] : no rashes [] : [place]-Hallpike test is negative [de-identified] : left beating gaze induced nystagmus [de-identified] : all 4 but felt dizzy to r [de-identified] : gait steady but heel-toe off balance

## 2021-01-13 ENCOUNTER — NON-APPOINTMENT (OUTPATIENT)
Age: 46
End: 2021-01-13

## 2021-01-13 ENCOUNTER — APPOINTMENT (OUTPATIENT)
Dept: OTOLARYNGOLOGY | Facility: CLINIC | Age: 46
End: 2021-01-13

## 2021-01-18 ENCOUNTER — NON-APPOINTMENT (OUTPATIENT)
Age: 46
End: 2021-01-18

## 2021-01-18 LAB
ALBUMIN SERPL ELPH-MCNC: 4.5 G/DL
ALP BLD-CCNC: 97 U/L
ALT SERPL-CCNC: 33 U/L
ANION GAP SERPL CALC-SCNC: 14 MMOL/L
APPEARANCE: CLEAR
AST SERPL-CCNC: 27 U/L
BASOPHILS # BLD AUTO: 0.04 K/UL
BASOPHILS NFR BLD AUTO: 0.5 %
BILIRUB SERPL-MCNC: 0.3 MG/DL
BILIRUBIN URINE: NEGATIVE
BLOOD URINE: NEGATIVE
BUN SERPL-MCNC: 19 MG/DL
CALCIUM SERPL-MCNC: 10.6 MG/DL
CHLORIDE SERPL-SCNC: 103 MMOL/L
CO2 SERPL-SCNC: 21 MMOL/L
COLOR: YELLOW
CREAT SERPL-MCNC: 0.77 MG/DL
EOSINOPHIL # BLD AUTO: 0.12 K/UL
EOSINOPHIL NFR BLD AUTO: 1.6 %
GLUCOSE QUALITATIVE U: NEGATIVE
GLUCOSE SERPL-MCNC: 106 MG/DL
HCT VFR BLD CALC: 44.9 %
HGB BLD-MCNC: 14.7 G/DL
IMM GRANULOCYTES NFR BLD AUTO: 0.3 %
KETONES URINE: NEGATIVE
LEUKOCYTE ESTERASE URINE: NEGATIVE
LYMPHOCYTES # BLD AUTO: 2.38 K/UL
LYMPHOCYTES NFR BLD AUTO: 31.1 %
MAN DIFF?: NORMAL
MCHC RBC-ENTMCNC: 30.8 PG
MCHC RBC-ENTMCNC: 32.7 GM/DL
MCV RBC AUTO: 93.9 FL
MONOCYTES # BLD AUTO: 0.42 K/UL
MONOCYTES NFR BLD AUTO: 5.5 %
NEUTROPHILS # BLD AUTO: 4.68 K/UL
NEUTROPHILS NFR BLD AUTO: 61 %
NITRITE URINE: NEGATIVE
PH URINE: 5.5
PLATELET # BLD AUTO: 258 K/UL
POTASSIUM SERPL-SCNC: 4.2 MMOL/L
PROT SERPL-MCNC: 7.4 G/DL
PROTEIN URINE: NORMAL
RBC # BLD: 4.78 M/UL
RBC # FLD: 11.9 %
SODIUM SERPL-SCNC: 137 MMOL/L
SPECIFIC GRAVITY URINE: 1.03
TSH SERPL-ACNC: 1.35 UIU/ML
UROBILINOGEN URINE: NORMAL
WBC # FLD AUTO: 7.66 K/UL

## 2021-01-21 ENCOUNTER — APPOINTMENT (OUTPATIENT)
Dept: MRI IMAGING | Facility: CLINIC | Age: 46
End: 2021-01-21
Payer: COMMERCIAL

## 2021-01-21 ENCOUNTER — RESULT REVIEW (OUTPATIENT)
Age: 46
End: 2021-01-21

## 2021-01-21 ENCOUNTER — OUTPATIENT (OUTPATIENT)
Dept: OUTPATIENT SERVICES | Facility: HOSPITAL | Age: 46
LOS: 1 days | End: 2021-01-21

## 2021-01-21 PROCEDURE — 70553 MRI BRAIN STEM W/O & W/DYE: CPT | Mod: 26

## 2021-01-26 ENCOUNTER — NON-APPOINTMENT (OUTPATIENT)
Age: 46
End: 2021-01-26

## 2021-01-28 ENCOUNTER — APPOINTMENT (OUTPATIENT)
Dept: OTOLARYNGOLOGY | Facility: CLINIC | Age: 46
End: 2021-01-28

## 2021-04-13 NOTE — PHYSICAL EXAM
[No Acute Distress] : no acute distress [Well-Appearing] : well-appearing [Well Nourished] : well nourished [Well Developed] : well developed [PERRL] : pupils equal round and reactive to light [Normal Sclera/Conjunctiva] : normal sclera/conjunctiva [EOMI] : extraocular movements intact [Normal Outer Ear/Nose] : the outer ears and nose were normal in appearance [Normal Oropharynx] : the oropharynx was normal [No JVD] : no jugular venous distention [No Lymphadenopathy] : no lymphadenopathy [Supple] : supple [Thyroid Normal, No Nodules] : the thyroid was normal and there were no nodules present [No Respiratory Distress] : no respiratory distress  [No Accessory Muscle Use] : no accessory muscle use [Clear to Auscultation] : lungs were clear to auscultation bilaterally [Normal Rate] : normal rate  [Regular Rhythm] : with a regular rhythm [Normal S1, S2] : normal S1 and S2 [No Murmur] : no murmur heard [No Abdominal Bruit] : a ~M bruit was not heard ~T in the abdomen [No Carotid Bruits] : no carotid bruits [No Varicosities] : no varicosities [No Edema] : there was no peripheral edema [Pedal Pulses Present] : the pedal pulses are present [No Extremity Clubbing/Cyanosis] : no extremity clubbing/cyanosis [Normal Appearance] : normal in appearance [No Palpable Aorta] : no palpable aorta [No Axillary Lymphadenopathy] : no axillary lymphadenopathy [Soft] : abdomen soft [No Nipple Discharge] : no nipple discharge [Non-distended] : non-distended [Non Tender] : non-tender [No HSM] : no HSM [No Masses] : no abdominal mass palpated [Normal Bowel Sounds] : normal bowel sounds [Normal Posterior Cervical Nodes] : no posterior cervical lymphadenopathy [Normal Anterior Cervical Nodes] : no anterior cervical lymphadenopathy [No Spinal Tenderness] : no spinal tenderness [No CVA Tenderness] : no CVA  tenderness [Grossly Normal Strength/Tone] : grossly normal strength/tone [No Rash] : no rash [No Joint Swelling] : no joint swelling [No Focal Deficits] : no focal deficits [Coordination Grossly Intact] : coordination grossly intact [Normal Gait] : normal gait [Normal Affect] : the affect was normal [Normal Insight/Judgement] : insight and judgment were intact [de-identified] : fibrocystic changes

## 2021-04-13 NOTE — ASSESSMENT
[FreeTextEntry1] : 45 asymptomatic pt,PE remarkable for fibrocystic breast changes.\par h/o mild chr.constipation,increase Fiber in diet advised.\par routine labs taken

## 2021-04-13 NOTE — COUNSELING
[None] : None [Good understanding] : Patient has a good understanding of lifestyle changes and steps needed to achieve self management goal [FreeTextEntry2] : healthy eating,exercise

## 2021-04-13 NOTE — HEALTH RISK ASSESSMENT
[Good] : ~his/her~ current health as good [10 or more (4 pts)] : 10 or more (4  points) [No falls in past year] : Patient reported no falls in the past year [No] : In the past 12 months have you used drugs other than those required for medical reasons? No [Patient/Caregiver not ready to engage] : Patient/Caregiver not ready to engage [0] : 2) Feeling down, depressed, or hopeless: Not at all (0) [] : No [[id number]] : 0

## 2021-04-13 NOTE — HISTORY OF PRESENT ILLNESS
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

## 2021-05-09 ENCOUNTER — TRANSCRIPTION ENCOUNTER (OUTPATIENT)
Age: 46
End: 2021-05-09

## 2021-05-25 ENCOUNTER — TRANSCRIPTION ENCOUNTER (OUTPATIENT)
Age: 46
End: 2021-05-25

## 2021-05-27 ENCOUNTER — APPOINTMENT (OUTPATIENT)
Dept: OBGYN | Facility: CLINIC | Age: 46
End: 2021-05-27
Payer: COMMERCIAL

## 2021-05-27 VITALS — DIASTOLIC BLOOD PRESSURE: 62 MMHG | HEART RATE: 76 BPM | SYSTOLIC BLOOD PRESSURE: 96 MMHG

## 2021-05-27 VITALS — TEMPERATURE: 98.2 F | HEIGHT: 62 IN | OXYGEN SATURATION: 98 % | BODY MASS INDEX: 21.35 KG/M2 | WEIGHT: 116 LBS

## 2021-05-27 DIAGNOSIS — Z30.09 ENCOUNTER FOR OTHER GENERAL COUNSELING AND ADVICE ON CONTRACEPTION: ICD-10-CM

## 2021-05-27 DIAGNOSIS — L29.2 PRURITUS VULVAE: ICD-10-CM

## 2021-05-27 PROCEDURE — 99396 PREV VISIT EST AGE 40-64: CPT

## 2021-05-27 PROCEDURE — 99072 ADDL SUPL MATRL&STAF TM PHE: CPT

## 2021-05-27 RX ORDER — CIPROFLOXACIN AND DEXAMETHASONE 3; 1 MG/ML; MG/ML
SUSPENSION/ DROPS AURICULAR (OTIC)
Refills: 0 | Status: COMPLETED | COMMUNITY
End: 2021-05-27

## 2021-05-27 NOTE — PHYSICAL EXAM
[Appropriately responsive] : appropriately responsive [Alert] : alert [No Acute Distress] : no acute distress [No Lymphadenopathy] : no lymphadenopathy [Regular Rate Rhythm] : regular rate rhythm [No Murmurs] : no murmurs [Clear to Auscultation B/L] : clear to auscultation bilaterally [Soft] : soft [Non-tender] : non-tender [Non-distended] : non-distended [No HSM] : No HSM [No Lesions] : no lesions [No Mass] : no mass [Oriented x3] : oriented x3 [Examination Of The Breasts] : a normal appearance [No Masses] : no breast masses were palpable [Labia Majora] : normal [Labia Minora] : normal [Discharge] : discharge [Moderate] : moderate [Foul Smelling] : not foul smelling [White] : white [Thick] : thick [Normal] : normal [Normal Position] : in a normal position [Uterine Adnexae] : normal

## 2021-05-27 NOTE — HISTORY OF PRESENT ILLNESS
[Normal Amount/Duration] :  normal amount and duration [Regular Cycle Intervals] : periods have been regular [Menarche Age: ____] : age at menarche was [unfilled] [FreeTextEntry1] : 05/11/2021 [Currently Active] : currently active [Men] : men [Vaginal] : vaginal [No] : No

## 2021-05-27 NOTE — PLAN
[FreeTextEntry1] : risks&benefits of ocps are explained to patient she understands her condition &agreed for pills

## 2021-05-28 LAB
CANDIDA VAG CYTO: NOT DETECTED
G VAGINALIS+PREV SP MTYP VAG QL MICRO: NOT DETECTED
T VAGINALIS VAG QL WET PREP: NOT DETECTED

## 2021-06-10 ENCOUNTER — APPOINTMENT (OUTPATIENT)
Dept: GASTROENTEROLOGY | Facility: CLINIC | Age: 46
End: 2021-06-10

## 2021-06-27 ENCOUNTER — EMERGENCY (EMERGENCY)
Facility: HOSPITAL | Age: 46
LOS: 1 days | Discharge: HOME | End: 2021-06-27
Attending: EMERGENCY MEDICINE | Admitting: EMERGENCY MEDICINE
Payer: OTHER MISCELLANEOUS

## 2021-06-27 VITALS
HEIGHT: 62 IN | SYSTOLIC BLOOD PRESSURE: 114 MMHG | TEMPERATURE: 98 F | RESPIRATION RATE: 16 BRPM | DIASTOLIC BLOOD PRESSURE: 50 MMHG | OXYGEN SATURATION: 100 % | HEART RATE: 71 BPM

## 2021-06-27 PROCEDURE — 73030 X-RAY EXAM OF SHOULDER: CPT | Mod: 26,LT

## 2021-06-27 PROCEDURE — 73110 X-RAY EXAM OF WRIST: CPT | Mod: 26,LT

## 2021-06-27 PROCEDURE — 73130 X-RAY EXAM OF HAND: CPT | Mod: 26,LT

## 2021-06-27 PROCEDURE — 99284 EMERGENCY DEPT VISIT MOD MDM: CPT

## 2021-06-27 RX ORDER — ACETAMINOPHEN 500 MG
650 TABLET ORAL ONCE
Refills: 0 | Status: COMPLETED | OUTPATIENT
Start: 2021-06-27 | End: 2021-06-27

## 2021-06-27 RX ADMIN — Medication 650 MILLIGRAM(S): at 17:28
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
## 2021-06-27 NOTE — ED PROVIDER NOTE - CLINICAL SUMMARY MEDICAL DECISION MAKING FREE TEXT BOX
46 yo RHD F pmh left shoulder dislocation >1 year ago presents with left upper extremity injury.  VSS.  Exam with global tenderness over shoulder, limited abduction; mild tenderness over left third metacarpal.  LUE neurovascularly intact.  Will obtain x-rays.  Patient declines analgesia at this time as ibuprofen was taken prior to ED arrival.  Dispo pending.

## 2021-06-27 NOTE — ED PROVIDER NOTE - PATIENT PORTAL LINK FT
You can access the FollowMyHealth Patient Portal offered by [facility] by registering at the following website: [email or web address]. By joining [facility] [employer] [facility] [employer]’s FollowMyHealth portal, you will also be able to view your health information using other applications (apps) compatible with our system.

## 2021-06-27 NOTE — ED PROVIDER NOTE - NSFOLLOWUPINSTRUCTIONS_ED_ALL_ED_FT
1.  Please take tylenol 500 mg every 6-8 hours as needed for pain.   2.  Please follow with orthopedics as early as able.   3.  Return to care for worsening pain, numbness, tingling, weakness.
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
## 2021-06-27 NOTE — ED PROVIDER NOTE - PHYSICAL EXAMINATION
GEN:  Non-toxic appearing, non-distressed, speaking full sentences, non-diaphoretic, AAOx3  HEENT:  NCAT, neck supple, EOMI, PERRLA, sclera anicteric, no conjunctival pallor or injection, no stridor, normal voice, no tonsillar exudate, uvula midline  SPINE:  no midline c/t/l spine tenderness or stepoff   CV:  regular rhythm and rate, s1/s2 audible, no murmurs, rubs or gallops, peripheral pulses 2+ and symmetric  PULM:  non-labored respirations, lungs clear to auscultation bilaterally, no wheezes, crackles or rales  ABD:  non distended, non-tender, no rebound, no guarding, negative Keenan's sign, bowel sounds normal, no cvat  MSK:  no gross deformity or stepoff of shoulder, elbow, wrist, hand; tenderness globally over left shoulder; tenderness over dorsal left third metacarpal shaft; no anatomical snuffbox tenderness; Patient able to adduct, internally rotate, limited abduction at shoulder; ROM intact at left elbow, wrist; supination and pronation intact at left forearm; AIN/PIN/U 5/5; SILT m/r/u/axillary; SILT throughout forearm; radial pulse 2+; brisk capillary refill in all digits   NEURO:  AAOx3, CN II-XII intact, motor 5/5 in upper and lower extremities bilaterally, sensation grossly intact in extremities and trunk  SKIN:  warm, dry, no rash or vesicles

## 2021-07-01 ENCOUNTER — APPOINTMENT (OUTPATIENT)
Dept: ORTHOPEDIC SURGERY | Facility: CLINIC | Age: 46
End: 2021-07-01
Payer: OTHER MISCELLANEOUS

## 2021-07-01 DIAGNOSIS — M75.80 OTHER SHOULDER LESIONS, UNSPECIFIED SHOULDER: ICD-10-CM

## 2021-07-01 PROCEDURE — 99072 ADDL SUPL MATRL&STAF TM PHE: CPT

## 2021-07-01 PROCEDURE — 73110 X-RAY EXAM OF WRIST: CPT | Mod: LT

## 2021-07-01 PROCEDURE — 99214 OFFICE O/P EST MOD 30 MIN: CPT

## 2021-07-03 ENCOUNTER — TRANSCRIPTION ENCOUNTER (OUTPATIENT)
Age: 46
End: 2021-07-03

## 2021-07-15 ENCOUNTER — APPOINTMENT (OUTPATIENT)
Dept: ORTHOPEDIC SURGERY | Facility: CLINIC | Age: 46
End: 2021-07-15
Payer: OTHER MISCELLANEOUS

## 2021-07-15 DIAGNOSIS — S69.92XA UNSPECIFIED INJURY OF LEFT WRIST, HAND AND FINGER(S), INITIAL ENCOUNTER: ICD-10-CM

## 2021-07-15 DIAGNOSIS — S49.92XA UNSPECIFIED INJURY OF LEFT SHOULDER AND UPPER ARM, INITIAL ENCOUNTER: ICD-10-CM

## 2021-07-15 PROCEDURE — 99072 ADDL SUPL MATRL&STAF TM PHE: CPT

## 2021-07-15 PROCEDURE — 99214 OFFICE O/P EST MOD 30 MIN: CPT

## 2021-07-16 ENCOUNTER — APPOINTMENT (OUTPATIENT)
Dept: ORTHOPEDIC SURGERY | Facility: CLINIC | Age: 46
End: 2021-07-16

## 2021-07-20 PROBLEM — S49.92XA INJURY OF SHOULDER, LEFT: Status: ACTIVE | Noted: 2021-07-01

## 2021-07-20 PROBLEM — S69.92XA LEFT WRIST INJURY: Status: ACTIVE | Noted: 2021-07-01

## 2021-07-20 NOTE — ASSESSMENT
[FreeTextEntry1] : 45 year old female followup for left shoulder pain presents after a fall with worsened left shoulder pain and new left wrist pain.  She is unable to work because of the pain.  She will remain out of work. We reviewed her radiographs which were negative for fracture. She was given a wrist splint to wear for comfort.  She was given a prescription for Meloxicam.  She was given a referral for PT. She will followup in 2 weeks. We discussed red flag symptoms that would require emergent evaluation. She knows to call with any questions or concerns or if her symptoms acutely worsen.

## 2021-07-20 NOTE — HISTORY OF PRESENT ILLNESS
[de-identified] : 44 year old female returns for followup for left shoulder and left wrist pain.  Since her last visit the pain has significantly improved.   She discontinued the wrist splint.  The meloxicam provides relief. She is doing PT and would like to continue.

## 2021-07-20 NOTE — PHYSICAL EXAM
[de-identified] : General: No acute distress, conversant, well-nourished.\par Head: Normocephalic, atraumatic\par Neck: trachea midline, FROM\par Heart: normotensive and normal rate and rhythm\par Lungs: No labored breathing\par Skin: No abrasions, no rashes, no edema\par Psych: Alert and oriented to person, place and time\par Extremities: no peripheral edema or digital cyanosis\par Gait: Normal gait. Can perform tandem gait. \par Vascular: warm and well perfused distally, palpable distal pulses\par \par MSK:\par Left wrist\par skin intact\par mild TTP\par no swelling\par SILT m/r/u\par +motor EPL/FPL/EDC/FDP/IO\par WWP distally\par \par Left Shoulder Exam:\par No swelling, no erythema. \par No tenderness to palpation \par No pain with active ROM\par \par Negative Neer's impingement sign\par Negative Hawkin's test\par Negative Andrea's test\par No pain and good strength with shoulder ER and IR.\par \par No tenderness at bicipital groove\par Negative Speed's test\par Negative Yergson's test\par \par Negative Cross-body Adduction\par Negative [place]'s test\par \par Sensation intact to light touch axillary, medial and lateral antebrachial cutaneous, median, radial and ulnar distributions\par +motor deltoid, biceps, triceps, EPL, FDP and IO\par palpable radial pulse, WWP distally  [de-identified] : I ordered left wrist radiographs to evalaute the patient's pain.\par \par Left wrist 4 views (including scaphoid view) obtained in the office today shows no fracture or dislocation.  \par \par MRI Left Shoulder (2/14/20): Partial tear/tendinosis of distal and posterior supraspinatus tendon

## 2021-07-20 NOTE — PHYSICAL EXAM
[de-identified] : General: No acute distress, conversant, well-nourished.\par Head: Normocephalic, atraumatic\par Neck: trachea midline, FROM\par Heart: normotensive and normal rate and rhythm\par Lungs: No labored breathing\par Skin: No abrasions, no rashes, no edema\par Psych: Alert and oriented to person, place and time\par Extremities: no peripheral edema or digital cyanosis\par Gait: Normal gait. Can perform tandem gait. \par Vascular: warm and well perfused distally, palpable distal pulses\par \par MSK:\par Left wrist\par skin intact\par no TTP\par no swelling\par SILT m/r/u\par +motor EPL/FPL/EDC/FDP/IO\par WWP distally\par \par Left Shoulder Exam:\par No swelling, no erythema. \par No tenderness to palpation \par No pain with active ROM\par \par Negative Neer's impingement sign\par Negative Hawkin's test\par Negative Andrea's test\par No pain and good strength with shoulder ER and IR.\par \par No tenderness at bicipital groove\par Negative Speed's test\par Negative Yergson's test\par \par Negative Cross-body Adduction\par Negative [place]'s test\par \par Sensation intact to light touch axillary, medial and lateral antebrachial cutaneous, median, radial and ulnar distributions\par +motor deltoid, biceps, triceps, EPL, FDP and IO\par palpable radial pulse, WWP distally  [de-identified] : Left wrist 4 views (including scaphoid view) (7/1/21) no fracture or dislocation.  \par \par MRI Left Shoulder (2/14/20): Partial tear/tendinosis of distal and posterior supraspinatus tendon

## 2021-07-20 NOTE — ASSESSMENT
[FreeTextEntry1] : 45 year old female followup for left shoulder and left wrist pain.  Since her last visit her symptoms have significantly improved.  She discontinue her wrist splint.  She will continue PT.  She will return to work without restriction on 7/26/21.  She can continue Meloxicam as needed for pain.  She will followup in 4 weeks. We discussed red flag symptoms that would require emergent evaluation. She knows to call with any questions or concerns or if her symptoms acutely worsen.

## 2021-07-20 NOTE — HISTORY OF PRESENT ILLNESS
[de-identified] : 44 year old female returns for followup for left shoulder pain after a work related injury on 12/23/19. She is also here with new orthopedic issue of left wrist pain after a mechanical fall.  The left shoulder pain has also increased.  She is unable to work due to the pain.

## 2021-07-21 ENCOUNTER — FORM ENCOUNTER (OUTPATIENT)
Age: 46
End: 2021-07-21

## 2021-08-01 ENCOUNTER — FORM ENCOUNTER (OUTPATIENT)
Age: 46
End: 2021-08-01

## 2021-08-10 ENCOUNTER — APPOINTMENT (OUTPATIENT)
Dept: ORTHOPEDIC SURGERY | Facility: CLINIC | Age: 46
End: 2021-08-10

## 2021-08-17 DIAGNOSIS — A09 INFECTIOUS GASTROENTERITIS AND COLITIS, UNSPECIFIED: ICD-10-CM

## 2021-09-04 ENCOUNTER — TRANSCRIPTION ENCOUNTER (OUTPATIENT)
Age: 46
End: 2021-09-04

## 2021-10-20 ENCOUNTER — APPOINTMENT (OUTPATIENT)
Dept: INTERNAL MEDICINE | Facility: CLINIC | Age: 46
End: 2021-10-20
Payer: COMMERCIAL

## 2021-10-20 VITALS
OXYGEN SATURATION: 96 % | BODY MASS INDEX: 21.95 KG/M2 | SYSTOLIC BLOOD PRESSURE: 106 MMHG | WEIGHT: 120 LBS | RESPIRATION RATE: 17 BRPM | HEART RATE: 90 BPM | DIASTOLIC BLOOD PRESSURE: 68 MMHG | TEMPERATURE: 98.3 F

## 2021-10-20 DIAGNOSIS — R42 DIZZINESS AND GIDDINESS: ICD-10-CM

## 2021-10-20 DIAGNOSIS — Z23 ENCOUNTER FOR IMMUNIZATION: ICD-10-CM

## 2021-10-20 DIAGNOSIS — E83.52 HYPERCALCEMIA: ICD-10-CM

## 2021-10-20 DIAGNOSIS — Z02.1 ENCOUNTER FOR PRE-EMPLOYMENT EXAMINATION: ICD-10-CM

## 2021-10-20 PROCEDURE — 99396 PREV VISIT EST AGE 40-64: CPT

## 2021-10-20 PROCEDURE — 99386 PREV VISIT NEW AGE 40-64: CPT

## 2021-10-20 NOTE — ASSESSMENT
[FreeTextEntry1] : 46 asymptomatic pt,PE remarkable for fibrocystic breast changes.referred to gyn\par h/o mild chr.constipation,increase Fiber in diet advised.\par routine labs taken

## 2021-10-20 NOTE — PHYSICAL EXAM
[Well Nourished] : well nourished [Well Developed] : well developed [Well-Appearing] : well-appearing [Normal Sclera/Conjunctiva] : normal sclera/conjunctiva [PERRL] : pupils equal round and reactive to light [EOMI] : extraocular movements intact [No JVD] : no jugular venous distention [No Lymphadenopathy] : no lymphadenopathy [Supple] : supple [Thyroid Normal, No Nodules] : the thyroid was normal and there were no nodules present [No Respiratory Distress] : no respiratory distress  [No Accessory Muscle Use] : no accessory muscle use [Clear to Auscultation] : lungs were clear to auscultation bilaterally [Normal Rate] : normal rate  [Regular Rhythm] : with a regular rhythm [Normal S1, S2] : normal S1 and S2 [No Murmur] : no murmur heard [No Carotid Bruits] : no carotid bruits [No Abdominal Bruit] : a ~M bruit was not heard ~T in the abdomen [No Varicosities] : no varicosities [Pedal Pulses Present] : the pedal pulses are present [No Edema] : there was no peripheral edema [No Palpable Aorta] : no palpable aorta [No Extremity Clubbing/Cyanosis] : no extremity clubbing/cyanosis [Normal Appearance] : normal in appearance [No Axillary Lymphadenopathy] : no axillary lymphadenopathy [No Nipple Discharge] : no nipple discharge [Soft] : abdomen soft [No Masses] : no abdominal mass palpated [No HSM] : no HSM [Normal Posterior Cervical Nodes] : no posterior cervical lymphadenopathy [Normal Anterior Cervical Nodes] : no anterior cervical lymphadenopathy [No CVA Tenderness] : no CVA  tenderness [No Spinal Tenderness] : no spinal tenderness [No Joint Swelling] : no joint swelling [Grossly Normal Strength/Tone] : grossly normal strength/tone [No Rash] : no rash [Coordination Grossly Intact] : coordination grossly intact [No Focal Deficits] : no focal deficits [Normal Gait] : normal gait [Normal Affect] : the affect was normal [Normal Insight/Judgement] : insight and judgment were intact [Normal Supraclavicular Nodes] : no supraclavicular lymphadenopathy [Normal Axillary Nodes] : no axillary lymphadenopathy [de-identified] : fibrocystic changes

## 2021-10-20 NOTE — HISTORY OF PRESENT ILLNESS
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

## 2021-10-20 NOTE — COUNSELING
[de-identified] : healthy eating,exercise [None] : None [Good understanding] : Patient has a good understanding of lifestyle changes and steps needed to achieve self management goal

## 2021-10-20 NOTE — HEALTH RISK ASSESSMENT
[Good] : ~his/her~  mood as  good [] : No [No] : In the past 12 months have you used drugs other than those required for medical reasons? No [No falls in past year] : Patient reported no falls in the past year [0] : 2) Feeling down, depressed, or hopeless: Not at all (0) [[id number]] : 0

## 2021-10-23 ENCOUNTER — NON-APPOINTMENT (OUTPATIENT)
Age: 46
End: 2021-10-23

## 2021-10-24 LAB
25(OH)D3 SERPL-MCNC: 20.9 NG/ML
ALBUMIN SERPL ELPH-MCNC: 4.7 G/DL
ALP BLD-CCNC: 80 U/L
ALT SERPL-CCNC: 24 U/L
ANION GAP SERPL CALC-SCNC: 12 MMOL/L
APPEARANCE: CLEAR
AST SERPL-CCNC: 20 U/L
BASOPHILS # BLD AUTO: 0.02 K/UL
BASOPHILS NFR BLD AUTO: 0.3 %
BILIRUB SERPL-MCNC: 0.3 MG/DL
BILIRUBIN URINE: NEGATIVE
BLOOD URINE: NEGATIVE
BUN SERPL-MCNC: 12 MG/DL
CALCIUM SERPL-MCNC: 10.6 MG/DL
CHLORIDE SERPL-SCNC: 104 MMOL/L
CHOLEST SERPL-MCNC: 162 MG/DL
CO2 SERPL-SCNC: 24 MMOL/L
COLOR: YELLOW
CREAT SERPL-MCNC: 0.76 MG/DL
EOSINOPHIL # BLD AUTO: 0.12 K/UL
EOSINOPHIL NFR BLD AUTO: 1.7 %
ESTIMATED AVERAGE GLUCOSE: 100 MG/DL
GLUCOSE QUALITATIVE U: NEGATIVE
GLUCOSE SERPL-MCNC: 95 MG/DL
HBA1C MFR BLD HPLC: 5.1 %
HBV SURFACE AB SERPL IA-ACNC: 22.5 MIU/ML
HCT VFR BLD CALC: 43.2 %
HDLC SERPL-MCNC: 68 MG/DL
HGB BLD-MCNC: 14.4 G/DL
IMM GRANULOCYTES NFR BLD AUTO: 0.3 %
KETONES URINE: NEGATIVE
LDLC SERPL CALC-MCNC: 80 MG/DL
LEUKOCYTE ESTERASE URINE: NEGATIVE
LYMPHOCYTES # BLD AUTO: 1.93 K/UL
LYMPHOCYTES NFR BLD AUTO: 27.3 %
MAN DIFF?: NORMAL
MCHC RBC-ENTMCNC: 31 PG
MCHC RBC-ENTMCNC: 33.3 GM/DL
MCV RBC AUTO: 93.1 FL
MONOCYTES # BLD AUTO: 0.41 K/UL
MONOCYTES NFR BLD AUTO: 5.8 %
NEUTROPHILS # BLD AUTO: 4.56 K/UL
NEUTROPHILS NFR BLD AUTO: 64.6 %
NITRITE URINE: NEGATIVE
NONHDLC SERPL-MCNC: 94 MG/DL
PH URINE: 6
PLATELET # BLD AUTO: 270 K/UL
POTASSIUM SERPL-SCNC: 5 MMOL/L
PROT SERPL-MCNC: 7.5 G/DL
PROTEIN URINE: NORMAL
RBC # BLD: 4.64 M/UL
RBC # FLD: 12.4 %
SODIUM SERPL-SCNC: 140 MMOL/L
SPECIFIC GRAVITY URINE: 1.03
TRIGL SERPL-MCNC: 66 MG/DL
TSH SERPL-ACNC: 1.61 UIU/ML
UROBILINOGEN URINE: NORMAL
WBC # FLD AUTO: 7.06 K/UL

## 2021-10-24 RX ORDER — ERGOCALCIFEROL 1.25 MG/1
1.25 MG CAPSULE, LIQUID FILLED ORAL
Qty: 12 | Refills: 3 | Status: ACTIVE | COMMUNITY
Start: 2021-10-24 | End: 1900-01-01

## 2021-10-29 DIAGNOSIS — K21.9 GASTRO-ESOPHAGEAL REFLUX DISEASE W/OUT ESOPHAGITIS: ICD-10-CM

## 2022-02-02 ENCOUNTER — APPOINTMENT (OUTPATIENT)
Dept: INTERNAL MEDICINE | Facility: CLINIC | Age: 47
End: 2022-02-02
Payer: COMMERCIAL

## 2022-02-02 VITALS
HEART RATE: 82 BPM | DIASTOLIC BLOOD PRESSURE: 54 MMHG | BODY MASS INDEX: 21.9 KG/M2 | WEIGHT: 119 LBS | TEMPERATURE: 98.4 F | RESPIRATION RATE: 16 BRPM | HEIGHT: 62 IN | SYSTOLIC BLOOD PRESSURE: 91 MMHG | OXYGEN SATURATION: 97 %

## 2022-02-02 DIAGNOSIS — E55.9 VITAMIN D DEFICIENCY, UNSPECIFIED: ICD-10-CM

## 2022-02-02 PROCEDURE — 99213 OFFICE O/P EST LOW 20 MIN: CPT

## 2022-02-02 NOTE — ASSESSMENT
[FreeTextEntry1] : 46 asymptomatic pt,PE remarkable for fibrocystic breast changes.\par h/o mild chr.constipation,increase Fiber in diet advised.\par routine labs taken\par health forms filled out

## 2022-02-04 ENCOUNTER — APPOINTMENT (OUTPATIENT)
Dept: ULTRASOUND IMAGING | Facility: CLINIC | Age: 47
End: 2022-02-04
Payer: COMMERCIAL

## 2022-02-04 ENCOUNTER — RESULT REVIEW (OUTPATIENT)
Age: 47
End: 2022-02-04

## 2022-02-04 ENCOUNTER — OUTPATIENT (OUTPATIENT)
Dept: OUTPATIENT SERVICES | Facility: HOSPITAL | Age: 47
LOS: 1 days | End: 2022-02-04

## 2022-02-04 ENCOUNTER — APPOINTMENT (OUTPATIENT)
Dept: MAMMOGRAPHY | Facility: CLINIC | Age: 47
End: 2022-02-04
Payer: COMMERCIAL

## 2022-02-04 PROCEDURE — 77067 SCR MAMMO BI INCL CAD: CPT | Mod: 26

## 2022-02-04 PROCEDURE — 77063 BREAST TOMOSYNTHESIS BI: CPT | Mod: 26

## 2022-04-11 PROBLEM — Z11.59 SCREENING FOR VIRAL DISEASE: Status: ACTIVE | Noted: 2020-09-15

## 2022-08-09 ENCOUNTER — APPOINTMENT (OUTPATIENT)
Dept: INTERNAL MEDICINE | Facility: CLINIC | Age: 47
End: 2022-08-09

## 2022-10-19 ENCOUNTER — APPOINTMENT (OUTPATIENT)
Dept: INTERNAL MEDICINE | Facility: CLINIC | Age: 47
End: 2022-10-19

## 2023-04-25 ENCOUNTER — APPOINTMENT (OUTPATIENT)
Dept: OBGYN | Facility: CLINIC | Age: 48
End: 2023-04-25
Payer: COMMERCIAL

## 2023-04-25 ENCOUNTER — APPOINTMENT (OUTPATIENT)
Dept: INTERNAL MEDICINE | Facility: CLINIC | Age: 48
End: 2023-04-25
Payer: COMMERCIAL

## 2023-04-25 VITALS
DIASTOLIC BLOOD PRESSURE: 62 MMHG | HEIGHT: 62 IN | HEART RATE: 67 BPM | SYSTOLIC BLOOD PRESSURE: 98 MMHG | OXYGEN SATURATION: 99 % | TEMPERATURE: 98.3 F | BODY MASS INDEX: 22.08 KG/M2 | WEIGHT: 120 LBS

## 2023-04-25 VITALS
WEIGHT: 120 LBS | HEIGHT: 62 IN | HEART RATE: 67 BPM | SYSTOLIC BLOOD PRESSURE: 98 MMHG | DIASTOLIC BLOOD PRESSURE: 62 MMHG | RESPIRATION RATE: 16 BRPM | TEMPERATURE: 98.3 F | OXYGEN SATURATION: 99 % | BODY MASS INDEX: 22.08 KG/M2

## 2023-04-25 DIAGNOSIS — B00.9 HERPESVIRAL INFECTION, UNSPECIFIED: ICD-10-CM

## 2023-04-25 DIAGNOSIS — Z00.00 ENCOUNTER FOR GENERAL ADULT MEDICAL EXAMINATION W/OUT ABNORMAL FINDINGS: ICD-10-CM

## 2023-04-25 DIAGNOSIS — Z01.419 ENCOUNTER FOR GYNECOLOGICAL EXAMINATION (GENERAL) (ROUTINE) W/OUT ABNORMAL FINDINGS: ICD-10-CM

## 2023-04-25 PROCEDURE — 99386 PREV VISIT NEW AGE 40-64: CPT

## 2023-04-25 PROCEDURE — 99214 OFFICE O/P EST MOD 30 MIN: CPT

## 2023-04-25 RX ORDER — NORGESTIMATE AND ETHINYL ESTRADIOL 7DAYSX3 28
0.18/0.215/0.25 KIT ORAL DAILY
Qty: 1 | Refills: 0 | Status: COMPLETED | COMMUNITY
Start: 2021-05-27 | End: 2023-04-25

## 2023-04-25 RX ORDER — METRONIDAZOLE 500 MG/1
500 TABLET ORAL 3 TIMES DAILY
Qty: 42 | Refills: 0 | Status: COMPLETED | COMMUNITY
Start: 2021-08-17 | End: 2023-04-25

## 2023-04-25 RX ORDER — MECLIZINE HYDROCHLORIDE 25 MG/1
TABLET ORAL
Refills: 0 | Status: COMPLETED | COMMUNITY
End: 2023-04-25

## 2023-04-25 RX ORDER — MELOXICAM 15 MG/1
15 TABLET ORAL
Qty: 30 | Refills: 1 | Status: COMPLETED | COMMUNITY
Start: 2021-07-01 | End: 2023-04-25

## 2023-04-25 RX ORDER — VALACYCLOVIR 1 G/1
1 TABLET, FILM COATED ORAL
Qty: 30 | Refills: 2 | Status: ACTIVE | COMMUNITY
Start: 2022-01-27 | End: 1900-01-01

## 2023-04-25 RX ORDER — PANTOPRAZOLE 40 MG/1
40 TABLET, DELAYED RELEASE ORAL DAILY
Qty: 30 | Refills: 5 | Status: COMPLETED | COMMUNITY
Start: 2021-10-29 | End: 2023-04-25

## 2023-04-25 NOTE — ASSESSMENT
[FreeTextEntry1] : 47 asymptomatic pt,PE remarkable for fibrocystic breast changes.\par h/o mild chr.constipation,increase Fiber in diet advised.\par routine labs taken\par

## 2023-04-25 NOTE — HISTORY OF PRESENT ILLNESS
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

## 2023-04-25 NOTE — HEALTH RISK ASSESSMENT
[[id number]] : 0 [Reports changes in hearing] : Reports no changes in hearing [Reports changes in vision] : Reports no changes in vision [Reports changes in dental health] : Reports no changes in dental health [MammogramDate] : 02/22

## 2023-04-25 NOTE — PHYSICAL EXAM
[Chaperone Present] : A chaperone was present in the examining room during all aspects of the physical examination [Appropriately responsive] : appropriately responsive [Alert] : alert [No Acute Distress] : no acute distress [No Lymphadenopathy] : no lymphadenopathy [Regular Rate Rhythm] : regular rate rhythm [No Murmurs] : no murmurs [Clear to Auscultation B/L] : clear to auscultation bilaterally [Soft] : soft [Non-tender] : non-tender [Non-distended] : non-distended [No HSM] : No HSM [No Lesions] : no lesions [No Mass] : no mass [Oriented x3] : oriented x3 [Examination Of The Breasts] : a normal appearance [Breast Palpation Diffuse Fibrous Tissue Bilateral] : fibrocystic changes [No Masses] : no breast masses were palpable [Discharge] : discharge [Scant] : scant [White] : white [Thin] : thin [Normal] : normal [Normal Position] : in a normal position [Foul Smelling] : not foul smelling

## 2023-04-25 NOTE — HISTORY OF PRESENT ILLNESS
[Normal Amount/Duration] :  normal amount and duration [Regular Cycle Intervals] : periods have been regular [Frequency: Q ___ days] : menstrual periods occur approximately every [unfilled] days [Menarche Age: ____] : age at menarche was [unfilled] [Currently Active] : currently active [Men] : men [Vaginal] : vaginal [No] : No [FreeTextEntry1] : 04/05/2023

## 2023-05-02 LAB — CYTOLOGY CVX/VAG DOC THIN PREP: NORMAL

## 2023-10-03 DIAGNOSIS — R92.30 INCONCLUSIVE MAMMOGRAM: ICD-10-CM

## 2023-10-03 DIAGNOSIS — R92.2 INCONCLUSIVE MAMMOGRAM: ICD-10-CM

## 2024-02-20 ENCOUNTER — APPOINTMENT (OUTPATIENT)
Dept: INTERNAL MEDICINE | Facility: CLINIC | Age: 49
End: 2024-02-20

## 2024-04-19 ENCOUNTER — APPOINTMENT (OUTPATIENT)
Dept: ORTHOPEDIC SURGERY | Facility: CLINIC | Age: 49
End: 2024-04-19
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
## 2024-07-22 ENCOUNTER — APPOINTMENT (OUTPATIENT)
Dept: OBGYN | Facility: CLINIC | Age: 49
End: 2024-07-22
Payer: COMMERCIAL

## 2024-07-22 VITALS
SYSTOLIC BLOOD PRESSURE: 101 MMHG | TEMPERATURE: 98.3 F | HEIGHT: 61 IN | DIASTOLIC BLOOD PRESSURE: 70 MMHG | OXYGEN SATURATION: 96 % | WEIGHT: 120 LBS | HEART RATE: 76 BPM | BODY MASS INDEX: 22.66 KG/M2

## 2024-07-22 DIAGNOSIS — Z01.419 ENCOUNTER FOR GYNECOLOGICAL EXAMINATION (GENERAL) (ROUTINE) W/OUT ABNORMAL FINDINGS: ICD-10-CM

## 2024-07-22 PROCEDURE — 99459 PELVIC EXAMINATION: CPT

## 2024-07-22 PROCEDURE — 99396 PREV VISIT EST AGE 40-64: CPT

## 2024-07-22 NOTE — HISTORY OF PRESENT ILLNESS
[Normal Amount/Duration] :  normal amount and duration [Regular Cycle Intervals] : periods have been regular [Menarche Age: ____] : age at menarche was [unfilled] [Currently Active] : currently active [Men] : men [Vaginal] : vaginal [No] : No [FreeTextEntry1] : 07/03/2024

## 2024-07-22 NOTE — PHYSICAL EXAM
[Chaperone Present] : A chaperone was present in the examining room during all aspects of the physical examination [[id number]] : A chaperone was present during the pelvic exam. [Appropriately responsive] : appropriately responsive [Alert] : alert [No Acute Distress] : no acute distress [No Lymphadenopathy] : no lymphadenopathy [Regular Rate Rhythm] : regular rate rhythm [No Murmurs] : no murmurs [Clear to Auscultation B/L] : clear to auscultation bilaterally [Soft] : soft [Non-tender] : non-tender [Non-distended] : non-distended [No HSM] : No HSM [No Lesions] : no lesions [No Mass] : no mass [Oriented x3] : oriented x3 [Examination Of The Breasts] : a normal appearance [No Masses] : no breast masses were palpable [Labia Majora] : normal [Labia Minora] : normal [Discharge] : discharge [Moderate] : moderate [White] : white [Thick] : thick [Normal] : normal [Normal Position] : in a normal position [Uterine Adnexae] : normal [FreeTextEntry2] : conor corley [Foul Smelling] : not foul smelling

## 2024-07-24 ENCOUNTER — TRANSCRIPTION ENCOUNTER (OUTPATIENT)
Age: 49
End: 2024-07-24

## 2024-07-24 ENCOUNTER — APPOINTMENT (OUTPATIENT)
Dept: OBGYN | Facility: CLINIC | Age: 49
End: 2024-07-24

## 2024-07-24 VITALS
HEART RATE: 70 BPM | BODY MASS INDEX: 22.66 KG/M2 | TEMPERATURE: 98.3 F | HEIGHT: 61 IN | OXYGEN SATURATION: 97 % | WEIGHT: 120 LBS

## 2024-07-24 DIAGNOSIS — L29.2 PRURITUS VULVAE: ICD-10-CM

## 2024-07-24 LAB
C TRACH RRNA SPEC QL NAA+PROBE: ABNORMAL
N GONORRHOEA RRNA SPEC QL NAA+PROBE: NOT DETECTED
SOURCE AMPLIFICATION: NORMAL

## 2024-07-24 PROCEDURE — ZZZZZ: CPT

## 2024-07-25 LAB
C TRACH RRNA SPEC QL NAA+PROBE: NOT DETECTED
HIV1+2 AB SPEC QL IA.RAPID: NONREACTIVE
HSV 1+2 IGG SER IA-IMP: POSITIVE
HSV 1+2 IGG SER IA-IMP: POSITIVE
HSV1 IGG SER QL: 57.8 INDEX
HSV2 IGG SER QL: 14.7 INDEX
SOURCE AMPLIFICATION: NORMAL
T PALLIDUM AB SER QL IA: NEGATIVE

## 2024-08-05 PROBLEM — R92.8 ABNORMAL MAMMOGRAM OF BOTH BREASTS: Status: ACTIVE | Noted: 2024-08-05

## 2024-09-16 ENCOUNTER — APPOINTMENT (OUTPATIENT)
Dept: ORTHOPEDIC SURGERY | Facility: CLINIC | Age: 49
End: 2024-09-16
Payer: COMMERCIAL

## 2024-09-16 VITALS — BODY MASS INDEX: 22.09 KG/M2 | HEIGHT: 61 IN | WEIGHT: 117 LBS

## 2024-09-16 PROBLEM — M70.70 HIP BURSITIS: Status: ACTIVE | Noted: 2024-09-16

## 2024-09-16 PROCEDURE — 20610 DRAIN/INJ JOINT/BURSA W/O US: CPT | Mod: 50

## 2024-09-16 PROCEDURE — 99204 OFFICE O/P NEW MOD 45 MIN: CPT | Mod: 25

## 2024-09-16 RX ORDER — METHYLPREDNISOLONE 4 MG/1
4 TABLET ORAL
Qty: 1 | Refills: 1 | Status: ACTIVE | COMMUNITY
Start: 2024-09-16 | End: 1900-01-01

## 2024-09-16 NOTE — HISTORY OF PRESENT ILLNESS
[de-identified] : First-time visit for this patient she has been seen here at [facility] by some other practitioners but first-time visit with me she is here with a complaint of bilateral hip pain.  Patient has a significant history of him being seen by pain management for lumbar issues but states he has a very specific and lateral hip pain in both hips.  She is here to have that evaluated.  Patient states the pain is ongoing for at least 3 to 6 months recalls no specific accident injury initiating traumatic event.

## 2024-09-16 NOTE — PHYSICAL EXAM
[de-identified] : Right hip on exam today patient has a exquisite tenderness palpation directly over the greater trochanter.  The hip has full passive internal/external rotation with no restriction mechanical block or pain neuro vas intact distally.  Similar findings on the left hip.  Exquisite tenderness with palpation directly over the greater trochanter.  Again hip has full passive internal/external rotation with no restriction panel block or pain.  Neurovascular intact. [de-identified] : Hip radiographs were ordered today AP lateral both hips were obtained showing well-preserved joint space in both hips no evidence of arthritis or any bony abnormalities.

## 2024-09-16 NOTE — DISCUSSION/SUMMARY
[de-identified] : Patient I talked at length about the underlying etiology of recurrent discomfort.  Utilizing a model as well as a drawing we are able to review the pertinent right hip joint patient able to appreciate how both of her trochanteric bursa's are inflamed with direct tenderness to palpation.  She was given cortisone injections to both hips she will return in a week's time for further evaluation and treatment possible repeat injection if not thoroughly improved.  Due to the intensity of her discomfort patient I also talked about other options in addition to the injections today patient will ice the areas routinely for the next week she will also be placed on a Medrol Dosepak the reasonable risk and benefits of medication were discussed in detail including potential side effects.  Reviewed patient current medication profile appears to be no relative current contraindication to its limited use.  Stated above patient return in 10 days to 2 weeks if not thoroughly improved.  This consultation lasted 35 minutes.

## 2024-09-16 NOTE — PROCEDURE
[de-identified] : [id number] LOT # [id number] EXP 2025/11 1% 500MG/50ML   KENALOG-10 [employer] NDC [id number] LOT # [id number] EXP 2/26 50MG/5mL  Patient given bilateral cortisone injections into the area of the greater trochanter greatest tenderness.  This was done under sterile conditions.  Patient tolerated injections well.

## 2024-09-16 NOTE — REASON FOR VISIT
[Initial Visit] : an initial visit for [Hip Pain] : hip pain [FreeTextEntry2] : BILATERAL HIP PAIN. DEISY STARTED TO FEEL THIS PAIN IN APRIL 2024 DUE TO EXERCISES AT THE GYM. SHE WENT TO HER DOCTOR WHICH SENT HER FOR MRI'S. SHE HAS RECIEVED INJECTIONS AND PT AND MEDICATIONS LIKE CELEBREX AND TRAMADOL FOR RELIEF. SHE FELT SLIGHT IMPROVEMENT. STATES THE PAIN GOES NOWN HER LEGS.

## 2024-09-23 ENCOUNTER — APPOINTMENT (OUTPATIENT)
Dept: ORTHOPEDIC SURGERY | Facility: CLINIC | Age: 49
End: 2024-09-23
Payer: COMMERCIAL

## 2024-09-23 DIAGNOSIS — M70.70 OTHER BURSITIS OF HIP, UNSPECIFIED HIP: ICD-10-CM

## 2024-09-23 PROCEDURE — 99214 OFFICE O/P EST MOD 30 MIN: CPT | Mod: 25

## 2024-09-23 PROCEDURE — 20610 DRAIN/INJ JOINT/BURSA W/O US: CPT | Mod: 50

## 2024-09-23 RX ORDER — CELECOXIB 200 MG/1
200 CAPSULE ORAL
Qty: 30 | Refills: 0 | Status: ACTIVE | COMMUNITY
Start: 2024-09-23 | End: 1900-01-01

## 2024-09-23 NOTE — DISCUSSION/SUMMARY
[de-identified] : Patient I talked about our options at this point because of her moderate continued discomfort despite improvement she agreed to repeat cortisone injections.  We also placed her on Celebrex 200 mg p.o. twice daily for 6-day course then to be taken thereafter as needed.  The reasonable risk benefits of medication were discussed in detail include potential side effects.  We also reviewed the patient's current medication profile appears to be no relative current contraindication to its limited use.  Patient will return to the office in 10 days to 2 weeks if not thoroughly improved for further evaluation treatment.  This consultation lasted 30 minutes.

## 2024-09-23 NOTE — PHYSICAL EXAM
[de-identified] : Right hip on exam today patient has a exquisite tenderness palpation directly over the greater trochanter.  The hip has full passive internal/external rotation with no restriction mechanical block or pain neuro vas intact distally.  Similar findings on the left hip.  Exquisite tenderness with palpation directly over the greater trochanter.  Again hip has full passive internal/external rotation with no restriction panel block or pain.  Neurovascular intact.  Both hips have less degree of discomfort in the previous exam.

## 2024-09-23 NOTE — HISTORY OF PRESENT ILLNESS
[de-identified] : Patient returns today she is seen most recently on September 16 6 days ago.  Patient states that she felt moderate improvement with the previous cortisone injection but still has pain in both hips in the area of the greater trochanter.  She is here for further evaluation and treatment options.  Patient also completed the Medrol Dosepak.

## 2024-09-23 NOTE — REASON FOR VISIT
[Follow-Up Visit] : a follow-up visit for [Hip Pain] : hip pain [FreeTextEntry2] : hip bursitis, prescribed methylprednisolone last visit, also given cortisone injection

## 2024-09-23 NOTE — PROCEDURE
[de-identified] : [facility] [id number] LOT # [id number] EXP 2026/03 1% 500MG/50ML   KENALOG-10 [email or web address] [facility] [id number] LOT # [id number] EXP 2/26 50MG/5ML  Patient given bilateral cortisone injections to the area of greater tenderness for both hips.  This is done in the area of the greater trochanteric bursa.  Patient tolerated injections well.

## 2024-09-24 ENCOUNTER — APPOINTMENT (OUTPATIENT)
Dept: ORTHOPEDIC SURGERY | Facility: CLINIC | Age: 49
End: 2024-09-24

## 2024-09-24 DIAGNOSIS — M54.50 LOW BACK PAIN, UNSPECIFIED: ICD-10-CM

## 2024-09-24 PROCEDURE — 20553 NJX 1/MLT TRIGGER POINTS 3/>: CPT

## 2024-09-24 PROCEDURE — 99214 OFFICE O/P EST MOD 30 MIN: CPT | Mod: 25

## 2024-09-24 PROCEDURE — 72110 X-RAY EXAM L-2 SPINE 4/>VWS: CPT

## 2024-09-24 NOTE — ASSESSMENT
[FreeTextEntry1] : 49 year old female presents with acute exacerbation of chronic low back pain.  She has pain in her hips and is being treated for glut tendonitis. She recently had hip injections by Dr. Avila.  She had a lumbar epidural in April which gave excellent relief but pain has returned. She denies recent illness, fevers, numbness, weakness, balance problems, saddle anesthesia, urinary retention or fecal incontinence.  She has recent MRI.  I independently reviewed her lumbar MRI which shows degenerative changes without compression of the neural elements. We discussed treatment options including physical therapy, medications, spinal injections.  The patient was given a referral for consideration for spinal injections.  The patient was given a referral for physical therapy.  Continue methocarbamol. She was given trigger point injections today which she toelrated well. F/U in 6 weeks. We discussed red flag symptoms that would require emergent evaluation. She knows to call with any questions or concerns or if her symptoms acutely worsen.

## 2024-09-24 NOTE — HISTORY OF PRESENT ILLNESS
[de-identified] : 49 year old female presents with acute exacerbation of chronic low back pain.  She has pain in her hips and is being treated for glut tendonitis. She recently had hip injections by Dr. Avila.  She had a lumbar epidural in April which gave excellent relief but pain has returned. She denies recent illness, fevers, numbness, weakness, balance problems, saddle anesthesia, urinary retention or fecal incontinence.  She has recent MRI.

## 2024-09-24 NOTE — PHYSICAL EXAM
[de-identified] : General: No acute distress, conversant, well-nourished. Head: Normocephalic, atraumatic Neck: trachea midline, FROM Heart: normotensive and normal rate and rhythm Lungs: No labored breathing Skin: No abrasions, no rashes, no edema Psych: Alert and oriented to person, place and time Extremities: no peripheral edema or digital cyanosis Gait: Normal gait. Can perform tandem gait.   Vascular: warm and well perfused distally, palpable distal pulses MSK: Lumbar spine: No tenderness to palpation.  No step-off, no deformity.  NEURO EXAM: Sensation  Left L2  -  2/2             Left L3  -  2/2 Left L4  -  2/2 Left L5  -  2/2 Left S1  -  2/2  Right L2  -  2/2             Right L3  -  2/2 Right L4  -  2/2 Right L5  -  2/2 Right S1  -  2/2  Motor:  Left L2 (hip flexion)                            5/5                 Left L3 (knee extension)                   5/5                 Left L4 (ankle dorsiflexion)                 5/5                 Left L5 (long toe extensor)                5/5                 Left S1 (ankle plantar flexion)           5/5  Right L2 (hip flexion)                            5/5                 Right L3 (knee extension)                   5/5                 Right L4 (ankle dorsiflexion)                 5/5                 Right L5 (long toe extensor)                5/5                 Right S1 (ankle plantar flexion)           5/5  Reflexes: Normal and symmetric Negative clonus.  Down-going Babinski.    [de-identified] : I ordered radiographs to evaluate the patient's symptoms. Lumbar 4 view radiographs taken in the office today show no dislocation or fracture.  Lumbar spondylosis.  No instability on dynamic series.  I independently reviewed her lumbar MRI which shows degenerative changes without compression of the neural elements.

## 2024-09-24 NOTE — PROCEDURE
[de-identified] : Procedure: Trigger Point Injections with corticosteroid (4 muscles) Pre-Procedure Diagnosis: Back pain Post-Procedure Diagnosis: same  The patient was educated about the risks and benefits of a corticosteroid injection.  Alternatives were discussed.  The patient understood and consented for the procedure.  The area was sterilely prepped using isopropyl alcohol.  An ethyl chloride spray provided  local anesthesia.  Using the usual sterile technique, 1 ml of 40mg/1ml of Kenalog, 2 ml of bupivacaine and 2 ml of Lidocaine 1% without epinephrine was injected into the trigger points. Dressings were applied to the areas.  The patient tolerated the procedure well and without complication.

## 2024-09-24 NOTE — REASON FOR VISIT
[Follow-Up Visit] : a follow-up visit for [Back Pain] : back pain [FreeTextEntry2] : low back pain, numbness/tingling in bl hips, pain radiates to lower extremities

## 2024-09-30 ENCOUNTER — APPOINTMENT (OUTPATIENT)
Dept: PHYSICAL MEDICINE AND REHAB | Facility: CLINIC | Age: 49
End: 2024-09-30

## 2024-10-07 ENCOUNTER — APPOINTMENT (OUTPATIENT)
Dept: ORTHOPEDIC SURGERY | Facility: CLINIC | Age: 49
End: 2024-10-07
Payer: COMMERCIAL

## 2024-10-07 DIAGNOSIS — M70.70 OTHER BURSITIS OF HIP, UNSPECIFIED HIP: ICD-10-CM

## 2024-10-07 PROCEDURE — 99214 OFFICE O/P EST MOD 30 MIN: CPT

## 2024-10-07 RX ORDER — METHYLPREDNISOLONE 4 MG/1
4 TABLET ORAL
Qty: 1 | Refills: 1 | Status: ACTIVE | COMMUNITY
Start: 2024-10-07 | End: 1900-01-01

## 2024-10-16 RX ORDER — CYCLOBENZAPRINE HYDROCHLORIDE 5 MG/1
5 TABLET, FILM COATED ORAL 3 TIMES DAILY
Qty: 42 | Refills: 1 | Status: ACTIVE | COMMUNITY
Start: 2024-10-16 | End: 1900-01-01

## 2024-11-05 ENCOUNTER — APPOINTMENT (OUTPATIENT)
Dept: ORTHOPEDIC SURGERY | Facility: CLINIC | Age: 49
End: 2024-11-05

## 2024-11-25 ENCOUNTER — APPOINTMENT (OUTPATIENT)
Dept: ORTHOPEDIC SURGERY | Facility: CLINIC | Age: 49
End: 2024-11-25

## 2024-12-02 NOTE — ED PROVIDER NOTE - PRINCIPAL DIAGNOSIS
I will let you know about final read    Short prednisone taper    I will send message to Dr. Gary about cellcept     Acute pain of left shoulder

## 2025-01-21 ENCOUNTER — EMERGENCY (EMERGENCY)
Facility: HOSPITAL | Age: 50
LOS: 1 days | Discharge: HOME | End: 2025-01-21
Attending: EMERGENCY MEDICINE
Payer: COMMERCIAL

## 2025-01-21 VITALS
TEMPERATURE: 98 F | DIASTOLIC BLOOD PRESSURE: 63 MMHG | SYSTOLIC BLOOD PRESSURE: 105 MMHG | OXYGEN SATURATION: 100 % | RESPIRATION RATE: 17 BRPM | HEART RATE: 75 BPM

## 2025-01-21 VITALS
HEIGHT: 61 IN | RESPIRATION RATE: 18 BRPM | HEART RATE: 104 BPM | TEMPERATURE: 98 F | WEIGHT: 119.93 LBS | DIASTOLIC BLOOD PRESSURE: 57 MMHG | OXYGEN SATURATION: 100 % | SYSTOLIC BLOOD PRESSURE: 115 MMHG

## 2025-01-21 LAB
APAP SERPL-MCNC: <15 UG/ML — SIGNIFICANT CHANGE UP (ref 10–30)
APPEARANCE UR: CLEAR — SIGNIFICANT CHANGE UP
BACTERIA # UR AUTO: ABNORMAL /HPF
BASOPHILS # BLD AUTO: 0.03 K/UL — SIGNIFICANT CHANGE UP (ref 0–0.2)
BASOPHILS NFR BLD AUTO: 0.3 % — SIGNIFICANT CHANGE UP (ref 0–2)
BILIRUB UR-MCNC: NEGATIVE — SIGNIFICANT CHANGE UP
CAST: 0 /LPF — SIGNIFICANT CHANGE UP (ref 0–4)
COLOR SPEC: YELLOW — SIGNIFICANT CHANGE UP
DIFF PNL FLD: NEGATIVE — SIGNIFICANT CHANGE UP
EOSINOPHIL # BLD AUTO: 0.06 K/UL — SIGNIFICANT CHANGE UP (ref 0–0.5)
EOSINOPHIL NFR BLD AUTO: 0.6 % — SIGNIFICANT CHANGE UP (ref 0–6)
ETHANOL SERPL-MCNC: <10 MG/DL — SIGNIFICANT CHANGE UP (ref 0–10)
GLUCOSE UR QL: NEGATIVE MG/DL — SIGNIFICANT CHANGE UP
HCG SERPL-ACNC: <2 MIU/ML — SIGNIFICANT CHANGE UP
HCT VFR BLD CALC: 42.8 % — SIGNIFICANT CHANGE UP (ref 34.5–45)
HGB BLD-MCNC: 14.5 G/DL — SIGNIFICANT CHANGE UP (ref 11.5–15.5)
IMM GRANULOCYTES NFR BLD AUTO: 0.3 % — SIGNIFICANT CHANGE UP (ref 0–0.9)
KETONES UR-MCNC: ABNORMAL MG/DL
LEUKOCYTE ESTERASE UR-ACNC: ABNORMAL
LYMPHOCYTES # BLD AUTO: 2.43 K/UL — SIGNIFICANT CHANGE UP (ref 1–3.3)
LYMPHOCYTES # BLD AUTO: 24.4 % — SIGNIFICANT CHANGE UP (ref 13–44)
MAGNESIUM SERPL-MCNC: 2.4 MG/DL — SIGNIFICANT CHANGE UP (ref 1.6–2.6)
MCHC RBC-ENTMCNC: 31.1 PG — SIGNIFICANT CHANGE UP (ref 27–34)
MCHC RBC-ENTMCNC: 33.9 G/DL — SIGNIFICANT CHANGE UP (ref 32–36)
MCV RBC AUTO: 91.8 FL — SIGNIFICANT CHANGE UP (ref 80–100)
MONOCYTES # BLD AUTO: 0.36 K/UL — SIGNIFICANT CHANGE UP (ref 0–0.9)
MONOCYTES NFR BLD AUTO: 3.6 % — SIGNIFICANT CHANGE UP (ref 2–14)
NEUTROPHILS # BLD AUTO: 7.04 K/UL — SIGNIFICANT CHANGE UP (ref 1.8–7.4)
NEUTROPHILS NFR BLD AUTO: 70.8 % — SIGNIFICANT CHANGE UP (ref 43–77)
NITRITE UR-MCNC: NEGATIVE — SIGNIFICANT CHANGE UP
NRBC # BLD: 0 /100 WBCS — SIGNIFICANT CHANGE UP (ref 0–0)
PH UR: 6 — SIGNIFICANT CHANGE UP (ref 5–8)
PHOSPHATE SERPL-MCNC: 3 MG/DL — SIGNIFICANT CHANGE UP (ref 2.5–4.5)
PLATELET # BLD AUTO: 328 K/UL — SIGNIFICANT CHANGE UP (ref 150–400)
PROT UR-MCNC: NEGATIVE MG/DL — SIGNIFICANT CHANGE UP
RBC # BLD: 4.66 M/UL — SIGNIFICANT CHANGE UP (ref 3.8–5.2)
RBC # FLD: 11.8 % — SIGNIFICANT CHANGE UP (ref 10.3–14.5)
RBC CASTS # UR COMP ASSIST: 3 /HPF — SIGNIFICANT CHANGE UP (ref 0–4)
SP GR SPEC: 1.02 — SIGNIFICANT CHANGE UP (ref 1–1.03)
SQUAMOUS # UR AUTO: 4 /HPF — SIGNIFICANT CHANGE UP (ref 0–5)
UROBILINOGEN FLD QL: 0.2 MG/DL — SIGNIFICANT CHANGE UP (ref 0.2–1)
WBC # BLD: 9.95 K/UL — SIGNIFICANT CHANGE UP (ref 3.8–10.5)
WBC # FLD AUTO: 9.95 K/UL — SIGNIFICANT CHANGE UP (ref 3.8–10.5)
WBC UR QL: 3 /HPF — SIGNIFICANT CHANGE UP (ref 0–5)

## 2025-01-21 RX ORDER — ONDANSETRON 4 MG/1
4 TABLET ORAL ONCE
Refills: 0 | Status: COMPLETED | OUTPATIENT
Start: 2025-01-21 | End: 2025-01-21

## 2025-01-21 RX ORDER — SODIUM CHLORIDE 9 MG/ML
1000 INJECTION, SOLUTION INTRAVENOUS ONCE
Refills: 0 | Status: COMPLETED | OUTPATIENT
Start: 2025-01-21 | End: 2025-01-21

## 2025-01-21 RX ADMIN — ONDANSETRON 4 MILLIGRAM(S): 4 TABLET ORAL at 17:10

## 2025-01-21 RX ADMIN — SODIUM CHLORIDE 1000 MILLILITER(S): 9 INJECTION, SOLUTION INTRAVENOUS at 17:10

## 2025-01-21 NOTE — ED PROVIDER NOTE - RAPID ASSESSMENT
48 yo F with a PMH of bilateral gluteal tendonitis dx 04/2024 has been requiring crutches since p/w abd cramping, watery diarrhea x 3 today, restlessness/uneasiness, vague chest pain and mild SOB since Friday. Pt states sxs feel like she is withdrawing from tramadol. Has been taking Tramadol 25mg daily x 5 months for chronic pain. Was trying to wean her self off so has been taking 1/2 dose since Wednesday. No fever, chills, nausea or vomiting. No bloody stool.     **Patient was rapidly assessed in triage by me, Huy Christine PA-C. A limited history was obtained. The patient will be seen and further examined and worked up in the main ED and their care will be completed by the main ED team. Receiving team will follow up on labs, medications, any clinical imaging, and perform reassessment and disposition of the patient as clinically indicated. All decisions regarding the progression of care will be made at their discretion.

## 2025-01-21 NOTE — ED PROVIDER NOTE - CLINICAL SUMMARY MEDICAL DECISION MAKING FREE TEXT BOX
43-year-old female with history of bilateral gluteal tendinitis diagnosed in April 2024 requiring crutches presenting with concerns for tramadol withdrawal in the setting of abdominal cramping, watery diarrhea, uneasiness/restlessness as well as nausea since Friday 3 days ago.  Patient has been taking 25 mg tramadol daily as well as 1000 mg Tylenol daily for the past several months for chronic pain.  She tried to wean herself off the tramadol by taking 10% dose over the past week but then developed the symptoms.  She denies fevers, chills, urinary symptoms.  States her liver enzymes were elevated recently.      On exam well-appearing, no acute distress vital signs with /57, heart rate 104, no fever, no hypoxia.  Abdomen.  Lungs clear to auscultation, borderline tachycardic, appears well-nourished, moist mucous membranes, no lower extremity edema.    Differential includes but is not limited to withdrawal, gastroenteritis, UTI.  Will plan on laboratory workup to evaluate electrolytes, tox labs including ethanol and acetaminophen, CBC, UA and then reassess. 43-year-old female with history of bilateral gluteal tendinitis diagnosed in April 2024 requiring crutches presenting with concerns for tramadol withdrawal in the setting of abdominal cramping, watery diarrhea, uneasiness/restlessness as well as nausea since Friday 3 days ago.  Patient has been taking 25 mg tramadol daily as well as 1000 mg Tylenol daily for the past several months for chronic pain.  She tried to wean herself off the tramadol by taking 10% dose over the past week but then developed the symptoms.  She denies fevers, chills, urinary symptoms.  States her liver enzymes were elevated recently.      On exam well-appearing, no acute distress vital signs with /57, heart rate 104, no fever, no hypoxia.  Abdomen.  Lungs clear to auscultation, borderline tachycardic, appears well-nourished, moist mucous membranes, no lower extremity edema.    Differential includes but is not limited to withdrawal, gastroenteritis, UTI.  Will plan on laboratory workup to evaluate electrolytes, tox labs including ethanol and acetaminophen, CBC, UA and then reassess.    see attending attestation authored by me, Parviz Disla MD, for further MDM details.

## 2025-01-21 NOTE — ED PROVIDER NOTE - NSFOLLOWUPINSTRUCTIONS_ED_ALL_ED_FT
You were seen for concern for withdrawal due to gastrointestinal symptoms, diarrhea, and anxiety. We performed labs to determine if you had high levels of acetaminophen in your body, this test returned at an appropriate level. We also checked your liver function tests which did not indicate any acute issue with your liver requiring intervention at this time. Your laboratory work, including urinalysis to assess for urinary tract infection was negative. If you wish to continue tapering your tramadol dose, you can continue taking your medication every day and decrease the dose week by week. If you begin to experience pain again, you can return to your normal dose, but should follow up with you primary care physician. If your symptoms persist for more than 1-2 weeks, or you feel lightheaded/dizzy, or are unable to keep fluids/food down, please return to the emergency department for evaluation.

## 2025-01-21 NOTE — ED PROVIDER NOTE - PROGRESS NOTE DETAILS
Sachin Turner MD PGY-1: Discussion with patient patient reports feeling down recently, the pain and social stressors with work have led to decreased mood.  Patient states that time she has passive suicidal ideation and wishes that she would be better off not being here.  She denies any specific plan or intent to commit harm against herself.  She states she would never do that to her son who is at bedside with her.  She denies any auditory or visual hallucinations. Denies thoughts of harming others. Given revelation will consult psychiatry. Sachin Turner MD PGY-1: Discussion with patient patient reports feeling down recently, the pain and social stressors with work have led to decreased mood.  Patient states that from time to time she has passive suicidal ideation and wishes that she would be better off not being here.  She denies any specific plan or intent to commit harm against herself.  She states she would never do that to her son who is at bedside with her.  She denies any auditory or visual hallucinations. Denies thoughts of harming others. Given revelation will consult psychiatry.

## 2025-01-21 NOTE — ED PROVIDER NOTE - ATTENDING CONTRIBUTION TO CARE
50 yo female hx of hip tendonitis on long-standing daily tramadol, has recently started to wean herself off, now presenting with increasing sadness, anxiety.  also reported ?lfts elevated as outpatient, states she has been taking 1g tylenol daily with the tramadol.    nontoxic on exam, conversant.  states she feels sad and sometimes wonders if things would be better off if she wasn't around, but then directly affirms she has no SI, no HI, has her son to live for, and would never harm herself.  cooperative and otherwise well appearing.    will check labs, psych consult for resources/support and reassess.

## 2025-02-24 NOTE — ED PROVIDER NOTE - OBJECTIVE STATEMENT
Yes
43 y/o F pt with no pertinent PMHx and PSHx presents to ED c/o L shoulder pain. 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 Pt relates that the pain has been progressively worsening throughout the day and is worse with flexion and abduction. Pt endorses muscle pain in the L upper shoulder and lateral neck, but denies any other acute complaints.

## 2025-03-11 DIAGNOSIS — R92.8 OTHER ABNORMAL AND INCONCLUSIVE FINDINGS ON DIAGNOSTIC IMAGING OF BREAST: ICD-10-CM

## 2025-08-19 ENCOUNTER — APPOINTMENT (OUTPATIENT)
Dept: ORTHOPEDIC SURGERY | Facility: CLINIC | Age: 50
End: 2025-08-19
Payer: MEDICAID

## 2025-08-19 DIAGNOSIS — M25.512 PAIN IN RIGHT SHOULDER: ICD-10-CM

## 2025-08-19 DIAGNOSIS — M25.511 PAIN IN RIGHT SHOULDER: ICD-10-CM

## 2025-08-19 DIAGNOSIS — M54.2 CERVICALGIA: ICD-10-CM

## 2025-08-19 DIAGNOSIS — S49.92XA UNSPECIFIED INJURY OF LEFT SHOULDER AND UPPER ARM, INITIAL ENCOUNTER: ICD-10-CM

## 2025-08-19 PROCEDURE — 99214 OFFICE O/P EST MOD 30 MIN: CPT | Mod: 25

## 2025-08-19 PROCEDURE — 73030 X-RAY EXAM OF SHOULDER: CPT | Mod: LT,RT

## 2025-08-19 PROCEDURE — 72050 X-RAY EXAM NECK SPINE 4/5VWS: CPT

## 2025-08-19 PROCEDURE — 20610 DRAIN/INJ JOINT/BURSA W/O US: CPT | Mod: LT

## 2025-08-24 PROBLEM — M54.2 NECK PAIN: Status: ACTIVE | Noted: 2025-08-24

## 2025-08-26 ENCOUNTER — APPOINTMENT (OUTPATIENT)
Dept: ORTHOPEDIC SURGERY | Facility: CLINIC | Age: 50
End: 2025-08-26

## 2025-08-26 PROCEDURE — 99214 OFFICE O/P EST MOD 30 MIN: CPT | Mod: 95

## 2025-09-10 ENCOUNTER — APPOINTMENT (OUTPATIENT)
Dept: ORTHOPEDIC SURGERY | Facility: CLINIC | Age: 50
End: 2025-09-10